# Patient Record
Sex: FEMALE | Race: WHITE | NOT HISPANIC OR LATINO | URBAN - METROPOLITAN AREA
[De-identification: names, ages, dates, MRNs, and addresses within clinical notes are randomized per-mention and may not be internally consistent; named-entity substitution may affect disease eponyms.]

---

## 2019-08-18 ENCOUNTER — OUTPATIENT (OUTPATIENT)
Dept: OUTPATIENT SERVICES | Facility: HOSPITAL | Age: 29
LOS: 1 days | End: 2019-08-18
Payer: COMMERCIAL

## 2019-08-19 DIAGNOSIS — O26.899 OTHER SPECIFIED PREGNANCY RELATED CONDITIONS, UNSPECIFIED TRIMESTER: ICD-10-CM

## 2019-08-19 DIAGNOSIS — Z3A.00 WEEKS OF GESTATION OF PREGNANCY NOT SPECIFIED: ICD-10-CM

## 2019-08-19 LAB
APPEARANCE UR: CLEAR — SIGNIFICANT CHANGE UP
BILIRUB UR-MCNC: NEGATIVE — SIGNIFICANT CHANGE UP
COLOR SPEC: YELLOW — SIGNIFICANT CHANGE UP
DIFF PNL FLD: NEGATIVE — SIGNIFICANT CHANGE UP
GLUCOSE UR QL: NEGATIVE — SIGNIFICANT CHANGE UP
KETONES UR-MCNC: 15 MG/DL
LEUKOCYTE ESTERASE UR-ACNC: NEGATIVE — SIGNIFICANT CHANGE UP
NITRITE UR-MCNC: NEGATIVE — SIGNIFICANT CHANGE UP
PH UR: 6 — SIGNIFICANT CHANGE UP (ref 5–8)
PROT UR-MCNC: NEGATIVE MG/DL — SIGNIFICANT CHANGE UP
SP GR SPEC: <=1.005 — SIGNIFICANT CHANGE UP (ref 1–1.03)
UROBILINOGEN FLD QL: 0.2 E.U./DL — SIGNIFICANT CHANGE UP

## 2019-08-19 PROCEDURE — 81003 URINALYSIS AUTO W/O SCOPE: CPT

## 2019-08-19 PROCEDURE — 99214 OFFICE O/P EST MOD 30 MIN: CPT

## 2019-08-19 RX ORDER — SODIUM CHLORIDE 9 MG/ML
1000 INJECTION, SOLUTION INTRAVENOUS
Refills: 0 | Status: DISCONTINUED | OUTPATIENT
Start: 2019-08-19 | End: 2019-09-03

## 2019-10-24 ENCOUNTER — INPATIENT (INPATIENT)
Facility: HOSPITAL | Age: 29
LOS: 3 days | Discharge: HOME | End: 2019-10-28
Attending: OBSTETRICS & GYNECOLOGY | Admitting: OBSTETRICS & GYNECOLOGY
Payer: COMMERCIAL

## 2019-10-24 VITALS — WEIGHT: 158.73 LBS | HEIGHT: 65 IN

## 2019-10-24 DIAGNOSIS — O26.899 OTHER SPECIFIED PREGNANCY RELATED CONDITIONS, UNSPECIFIED TRIMESTER: ICD-10-CM

## 2019-10-24 DIAGNOSIS — Z3A.00 WEEKS OF GESTATION OF PREGNANCY NOT SPECIFIED: ICD-10-CM

## 2019-10-24 LAB
BASOPHILS # BLD AUTO: 0.04 K/UL — SIGNIFICANT CHANGE UP (ref 0–0.2)
BASOPHILS NFR BLD AUTO: 0.3 % — SIGNIFICANT CHANGE UP (ref 0–2)
EOSINOPHIL # BLD AUTO: 0.06 K/UL — SIGNIFICANT CHANGE UP (ref 0–0.5)
EOSINOPHIL NFR BLD AUTO: 0.5 % — SIGNIFICANT CHANGE UP (ref 0–6)
HCT VFR BLD CALC: 35.2 % — SIGNIFICANT CHANGE UP (ref 34.5–45)
HGB BLD-MCNC: 11.8 G/DL — SIGNIFICANT CHANGE UP (ref 11.5–15.5)
IMM GRANULOCYTES NFR BLD AUTO: 0.9 % — SIGNIFICANT CHANGE UP (ref 0–1.5)
LYMPHOCYTES # BLD AUTO: 18 % — SIGNIFICANT CHANGE UP (ref 13–44)
LYMPHOCYTES # BLD AUTO: 2.29 K/UL — SIGNIFICANT CHANGE UP (ref 1–3.3)
MCHC RBC-ENTMCNC: 28.9 PG — SIGNIFICANT CHANGE UP (ref 27–34)
MCHC RBC-ENTMCNC: 33.5 GM/DL — SIGNIFICANT CHANGE UP (ref 32–36)
MCV RBC AUTO: 86.3 FL — SIGNIFICANT CHANGE UP (ref 80–100)
MONOCYTES # BLD AUTO: 0.9 K/UL — SIGNIFICANT CHANGE UP (ref 0–0.9)
MONOCYTES NFR BLD AUTO: 7.1 % — SIGNIFICANT CHANGE UP (ref 2–14)
NEUTROPHILS # BLD AUTO: 9.3 K/UL — HIGH (ref 1.8–7.4)
NEUTROPHILS NFR BLD AUTO: 73.2 % — SIGNIFICANT CHANGE UP (ref 43–77)
NRBC # BLD: 0 /100 WBCS — SIGNIFICANT CHANGE UP (ref 0–0)
PLATELET # BLD AUTO: 204 K/UL — SIGNIFICANT CHANGE UP (ref 150–400)
RBC # BLD: 4.08 M/UL — SIGNIFICANT CHANGE UP (ref 3.8–5.2)
RBC # FLD: 13.7 % — SIGNIFICANT CHANGE UP (ref 10.3–14.5)
T PALLIDUM AB TITR SER: NEGATIVE — SIGNIFICANT CHANGE UP
WBC # BLD: 12.71 K/UL — HIGH (ref 3.8–10.5)
WBC # FLD AUTO: 12.71 K/UL — HIGH (ref 3.8–10.5)

## 2019-10-24 RX ORDER — CITRIC ACID/SODIUM CITRATE 300-500 MG
30 SOLUTION, ORAL ORAL ONCE
Refills: 0 | Status: DISCONTINUED | OUTPATIENT
Start: 2019-10-24 | End: 2019-10-25

## 2019-10-24 RX ORDER — OXYTOCIN 10 UNIT/ML
333.33 VIAL (ML) INJECTION
Qty: 20 | Refills: 0 | Status: DISCONTINUED | OUTPATIENT
Start: 2019-10-24 | End: 2019-10-25

## 2019-10-24 RX ORDER — SODIUM CHLORIDE 9 MG/ML
1000 INJECTION, SOLUTION INTRAVENOUS
Refills: 0 | Status: DISCONTINUED | OUTPATIENT
Start: 2019-10-24 | End: 2019-10-24

## 2019-10-24 RX ORDER — AZITHROMYCIN 500 MG/1
500 TABLET, FILM COATED ORAL ONCE
Refills: 0 | Status: COMPLETED | OUTPATIENT
Start: 2019-10-24 | End: 2019-10-25

## 2019-10-24 RX ORDER — OXYTOCIN 10 UNIT/ML
1 VIAL (ML) INJECTION
Qty: 30 | Refills: 0 | Status: DISCONTINUED | OUTPATIENT
Start: 2019-10-24 | End: 2019-10-25

## 2019-10-24 RX ORDER — SODIUM CHLORIDE 9 MG/ML
1000 INJECTION, SOLUTION INTRAVENOUS
Refills: 0 | Status: DISCONTINUED | OUTPATIENT
Start: 2019-10-24 | End: 2019-10-25

## 2019-10-24 RX ORDER — CITRIC ACID/SODIUM CITRATE 300-500 MG
15 SOLUTION, ORAL ORAL EVERY 6 HOURS
Refills: 0 | Status: DISCONTINUED | OUTPATIENT
Start: 2019-10-24 | End: 2019-10-25

## 2019-10-24 RX ORDER — CEFAZOLIN SODIUM 1 G
2000 VIAL (EA) INJECTION ONCE
Refills: 0 | Status: DISCONTINUED | OUTPATIENT
Start: 2019-10-24 | End: 2019-10-25

## 2019-10-24 RX ORDER — FENTANYL/BUPIVACAINE/NS/PF 2MCG/ML-.1
250 PLASTIC BAG, INJECTION (ML) INJECTION
Refills: 0 | Status: DISCONTINUED | OUTPATIENT
Start: 2019-10-24 | End: 2019-10-25

## 2019-10-24 RX ORDER — CHLORHEXIDINE GLUCONATE 213 G/1000ML
1 SOLUTION TOPICAL
Refills: 0 | Status: DISCONTINUED | OUTPATIENT
Start: 2019-10-24 | End: 2019-10-25

## 2019-10-24 RX ADMIN — Medication 1 MILLIUNIT(S)/MIN: at 11:02

## 2019-10-24 RX ADMIN — SODIUM CHLORIDE 125 MILLILITER(S): 9 INJECTION, SOLUTION INTRAVENOUS at 07:00

## 2019-10-24 RX ADMIN — SODIUM CHLORIDE 125 MILLILITER(S): 9 INJECTION, SOLUTION INTRAVENOUS at 06:30

## 2019-10-24 RX ADMIN — SODIUM CHLORIDE 125 MILLILITER(S): 9 INJECTION, SOLUTION INTRAVENOUS at 13:27

## 2019-10-25 RX ORDER — SIMETHICONE 80 MG/1
80 TABLET, CHEWABLE ORAL EVERY 4 HOURS
Refills: 0 | Status: DISCONTINUED | OUTPATIENT
Start: 2019-10-25 | End: 2019-10-28

## 2019-10-25 RX ORDER — DIPHENHYDRAMINE HCL 50 MG
25 CAPSULE ORAL EVERY 6 HOURS
Refills: 0 | Status: DISCONTINUED | OUTPATIENT
Start: 2019-10-25 | End: 2019-10-28

## 2019-10-25 RX ORDER — OXYCODONE HYDROCHLORIDE 5 MG/1
5 TABLET ORAL
Refills: 0 | Status: DISCONTINUED | OUTPATIENT
Start: 2019-10-25 | End: 2019-10-28

## 2019-10-25 RX ORDER — GLYCERIN ADULT
1 SUPPOSITORY, RECTAL RECTAL AT BEDTIME
Refills: 0 | Status: DISCONTINUED | OUTPATIENT
Start: 2019-10-25 | End: 2019-10-28

## 2019-10-25 RX ORDER — ACETAMINOPHEN 500 MG
975 TABLET ORAL
Refills: 0 | Status: DISCONTINUED | OUTPATIENT
Start: 2019-10-25 | End: 2019-10-28

## 2019-10-25 RX ORDER — IBUPROFEN 200 MG
600 TABLET ORAL EVERY 6 HOURS
Refills: 0 | Status: COMPLETED | OUTPATIENT
Start: 2019-10-25 | End: 2020-09-22

## 2019-10-25 RX ORDER — MORPHINE SULFATE 50 MG/1
4 CAPSULE, EXTENDED RELEASE ORAL ONCE
Refills: 0 | Status: DISCONTINUED | OUTPATIENT
Start: 2019-10-25 | End: 2019-10-26

## 2019-10-25 RX ORDER — TETANUS TOXOID, REDUCED DIPHTHERIA TOXOID AND ACELLULAR PERTUSSIS VACCINE, ADSORBED 5; 2.5; 8; 8; 2.5 [IU]/.5ML; [IU]/.5ML; UG/.5ML; UG/.5ML; UG/.5ML
0.5 SUSPENSION INTRAMUSCULAR ONCE
Refills: 0 | Status: DISCONTINUED | OUTPATIENT
Start: 2019-10-25 | End: 2019-10-28

## 2019-10-25 RX ORDER — LANOLIN
1 OINTMENT (GRAM) TOPICAL EVERY 6 HOURS
Refills: 0 | Status: DISCONTINUED | OUTPATIENT
Start: 2019-10-25 | End: 2019-10-28

## 2019-10-25 RX ORDER — INFLUENZA VIRUS VACCINE 15; 15; 15; 15 UG/.5ML; UG/.5ML; UG/.5ML; UG/.5ML
0.5 SUSPENSION INTRAMUSCULAR ONCE
Refills: 0 | Status: COMPLETED | OUTPATIENT
Start: 2019-10-25 | End: 2019-10-25

## 2019-10-25 RX ORDER — MAGNESIUM HYDROXIDE 400 MG/1
30 TABLET, CHEWABLE ORAL
Refills: 0 | Status: DISCONTINUED | OUTPATIENT
Start: 2019-10-25 | End: 2019-10-28

## 2019-10-25 RX ORDER — NALOXONE HYDROCHLORIDE 4 MG/.1ML
0.1 SPRAY NASAL
Refills: 0 | Status: DISCONTINUED | OUTPATIENT
Start: 2019-10-25 | End: 2019-10-26

## 2019-10-25 RX ORDER — ENOXAPARIN SODIUM 100 MG/ML
40 INJECTION SUBCUTANEOUS EVERY 24 HOURS
Refills: 0 | Status: DISCONTINUED | OUTPATIENT
Start: 2019-10-25 | End: 2019-10-28

## 2019-10-25 RX ORDER — ONDANSETRON 8 MG/1
4 TABLET, FILM COATED ORAL EVERY 6 HOURS
Refills: 0 | Status: DISCONTINUED | OUTPATIENT
Start: 2019-10-25 | End: 2019-10-28

## 2019-10-25 RX ORDER — DOCUSATE SODIUM 100 MG
100 CAPSULE ORAL
Refills: 0 | Status: DISCONTINUED | OUTPATIENT
Start: 2019-10-25 | End: 2019-10-28

## 2019-10-25 RX ORDER — SODIUM CHLORIDE 9 MG/ML
1000 INJECTION, SOLUTION INTRAVENOUS
Refills: 0 | Status: DISCONTINUED | OUTPATIENT
Start: 2019-10-25 | End: 2019-10-26

## 2019-10-25 RX ORDER — KETOROLAC TROMETHAMINE 30 MG/ML
30 SYRINGE (ML) INJECTION EVERY 6 HOURS
Refills: 0 | Status: DISCONTINUED | OUTPATIENT
Start: 2019-10-25 | End: 2019-10-26

## 2019-10-25 RX ORDER — OXYCODONE HYDROCHLORIDE 5 MG/1
5 TABLET ORAL ONCE
Refills: 0 | Status: DISCONTINUED | OUTPATIENT
Start: 2019-10-25 | End: 2019-10-28

## 2019-10-25 RX ORDER — OXYTOCIN 10 UNIT/ML
333.33 VIAL (ML) INJECTION
Qty: 20 | Refills: 0 | Status: DISCONTINUED | OUTPATIENT
Start: 2019-10-25 | End: 2019-10-26

## 2019-10-25 RX ADMIN — SIMETHICONE 80 MILLIGRAM(S): 80 TABLET, CHEWABLE ORAL at 20:57

## 2019-10-25 RX ADMIN — Medication 100 MILLIGRAM(S): at 12:13

## 2019-10-25 RX ADMIN — ENOXAPARIN SODIUM 40 MILLIGRAM(S): 100 INJECTION SUBCUTANEOUS at 18:39

## 2019-10-25 RX ADMIN — Medication 975 MILLIGRAM(S): at 09:07

## 2019-10-25 RX ADMIN — Medication 975 MILLIGRAM(S): at 16:30

## 2019-10-25 RX ADMIN — Medication 975 MILLIGRAM(S): at 15:29

## 2019-10-25 RX ADMIN — AZITHROMYCIN 255 MILLIGRAM(S): 500 TABLET, FILM COATED ORAL at 00:20

## 2019-10-25 RX ADMIN — Medication 30 MILLIGRAM(S): at 19:30

## 2019-10-25 RX ADMIN — Medication 30 MILLIGRAM(S): at 04:00

## 2019-10-25 RX ADMIN — Medication 975 MILLIGRAM(S): at 21:45

## 2019-10-25 RX ADMIN — Medication 30 MILLIGRAM(S): at 12:12

## 2019-10-25 RX ADMIN — SODIUM CHLORIDE 125 MILLILITER(S): 9 INJECTION, SOLUTION INTRAVENOUS at 00:00

## 2019-10-25 RX ADMIN — Medication 30 MILLIGRAM(S): at 18:39

## 2019-10-25 RX ADMIN — Medication 30 MILLIGRAM(S): at 13:00

## 2019-10-25 RX ADMIN — Medication 975 MILLIGRAM(S): at 03:30

## 2019-10-25 RX ADMIN — Medication 975 MILLIGRAM(S): at 20:57

## 2019-10-25 RX ADMIN — Medication 975 MILLIGRAM(S): at 10:00

## 2019-10-25 NOTE — LACTATION INITIAL EVALUATION - REQUESTED BY
lactation rounds, initial visit. Primaparous mother delivered @ 404/7 weeks gestation via c/s. 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 The bedside RN reported that the  had a low temperture on the night shift & a low chemstrip of 44 @ 7:09 am. The  was given dextrose oral gel & fed with 12 mls of formula & the chemstrip came up to 65 & have been WNL since. The bedside RN & the mother report that the  has been latching on & breast feeding well. The  was ~ 15 hours old when I consulted @ the bedside & the mother reported that the  breast fed for 20 minutes on each breast ~ 30 minutes before I arrived @ the bedside. The  was asleep. The  was mec stained @ birth & remains DTV. Breast feeding guidelines, positioning the  to the breast, latching strategies, early feeding cues, cluster feedings, hand expression and 's output requirements reviewed with the mother. Written literature was given to the mother on the above. S2S was encouraged. All questions were answered and the mother verbalized understanding. I encouraged the mother to ask for lactation assistance as needed.

## 2019-10-26 LAB
HCT VFR BLD CALC: 25.7 % — LOW (ref 34.5–45)
HGB BLD-MCNC: 8.3 G/DL — LOW (ref 11.5–15.5)
MCHC RBC-ENTMCNC: 28.8 PG — SIGNIFICANT CHANGE UP (ref 27–34)
MCHC RBC-ENTMCNC: 32.3 GM/DL — SIGNIFICANT CHANGE UP (ref 32–36)
MCV RBC AUTO: 89.2 FL — SIGNIFICANT CHANGE UP (ref 80–100)
NRBC # BLD: 0 /100 WBCS — SIGNIFICANT CHANGE UP (ref 0–0)
PLATELET # BLD AUTO: 166 K/UL — SIGNIFICANT CHANGE UP (ref 150–400)
RBC # BLD: 2.88 M/UL — LOW (ref 3.8–5.2)
RBC # FLD: 14.5 % — SIGNIFICANT CHANGE UP (ref 10.3–14.5)
WBC # BLD: 20.72 K/UL — HIGH (ref 3.8–10.5)
WBC # FLD AUTO: 20.72 K/UL — HIGH (ref 3.8–10.5)

## 2019-10-26 RX ORDER — IBUPROFEN 200 MG
600 TABLET ORAL EVERY 6 HOURS
Refills: 0 | Status: DISCONTINUED | OUTPATIENT
Start: 2019-10-26 | End: 2019-10-28

## 2019-10-26 RX ADMIN — ENOXAPARIN SODIUM 40 MILLIGRAM(S): 100 INJECTION SUBCUTANEOUS at 14:34

## 2019-10-26 RX ADMIN — Medication 600 MILLIGRAM(S): at 14:30

## 2019-10-26 RX ADMIN — Medication 600 MILLIGRAM(S): at 23:10

## 2019-10-26 RX ADMIN — SIMETHICONE 80 MILLIGRAM(S): 80 TABLET, CHEWABLE ORAL at 06:22

## 2019-10-26 RX ADMIN — Medication 100 MILLIGRAM(S): at 18:47

## 2019-10-26 RX ADMIN — Medication 30 MILLIGRAM(S): at 00:31

## 2019-10-26 RX ADMIN — Medication 100 MILLIGRAM(S): at 06:22

## 2019-10-26 RX ADMIN — Medication 600 MILLIGRAM(S): at 22:33

## 2019-10-26 RX ADMIN — Medication 975 MILLIGRAM(S): at 11:01

## 2019-10-26 RX ADMIN — Medication 975 MILLIGRAM(S): at 18:46

## 2019-10-26 RX ADMIN — Medication 975 MILLIGRAM(S): at 11:35

## 2019-10-26 RX ADMIN — Medication 600 MILLIGRAM(S): at 06:22

## 2019-10-26 RX ADMIN — Medication 600 MILLIGRAM(S): at 07:00

## 2019-10-26 RX ADMIN — Medication 30 MILLIGRAM(S): at 01:15

## 2019-10-26 RX ADMIN — Medication 600 MILLIGRAM(S): at 15:10

## 2019-10-26 NOTE — DISCHARGE NOTE OB - CARE PLAN
Principal Discharge DX:	Postpartum state  Goal:	to feel; well  Assessment and plan of treatment:	29y female s/p  section, meeting all postpartum milestones. No heavy lifting. Pelvic rest until cleared. Follow-up with obstetrician in 1-2 weeks.

## 2019-10-26 NOTE — DISCHARGE NOTE OB - CARE PROVIDER_API CALL
Kiana Quach (DO; MS)  [facility]  [facility] [address] [place] [address]  Phone: [phone]  Fax: [phone]  Follow Up Time:

## 2019-10-26 NOTE — DISCHARGE NOTE OB - PLAN OF CARE
to feel; well 29y female s/p  section, meeting all postpartum milestones. No heavy lifting. Pelvic rest until cleared. Follow-up with obstetrician in 1-2 weeks.

## 2019-10-26 NOTE — PROGRESS NOTE ADULT - ASSESSMENT
A/P   29y  s/p  section, POD #1, stable  -  Pain: PO motrin, percocets for severe pain PRN  -  Post-operatively labs: f/u post-op Hgb , hemodynamically stable, no symptoms of anemia   -  GI: tolerating clears, passing gas, ADAT  -  : passed TOV  -  DVT prophylaxis: ambulation, SCDs, SQH  -  Dispo: POD 3 or 4

## 2019-10-26 NOTE — PROGRESS NOTE ADULT - SUBJECTIVE AND OBJECTIVE BOX
6/12/2017       RE: Allyson Sauceda  [address] [place] [address]           Dear Colleague,    Thank you for referring your patient, Alylson Sauceda, to the [facility] ORTHOPEDIC SURGERY. Please see a copy of my visit note below.    HISTORY OF PRESENT ILLNESS:    Allyson Saucead is a 49 year old female who is seen in consultation at the request of Dr. Bolivar for left elbow, cyst.    Present symptoms: Pt states cyst has been present for over a year, has slowly grown in size.  States it is not painful but will have some discomfort if resting on elbow for extended periods.  She is right-hand dominant.  No limitation of elbow range of motion  No constitutional symptoms Of significant weight loss, nighttime fever or chills  Treatments tried to this point: nothing  Orthopedic PMH: neck surgery - C5/6    No past medical history on file.None other than prior neck problem    Past Surgical History:   Procedure Laterality Date     NECK SURGERY  2005    C5-6 fracture - has plates placed       Family History   Problem Relation Age of Onset     DIABETES Mother      Hyperlipidemia Mother      Alzheimer Disease Mother      Hypertension Mother      Alzheimer Disease Maternal Grandmother      Alzheimer Disease Maternal Aunt      Breast Cancer No family hx of      Coronary Artery Disease No family hx of      Colon Cancer No family hx of      Thyroid Disease No family hx of      Genetic Disorder No family hx of        Social History     Social History     Marital status:      Spouse name: N/A     Number of children: N/A     Years of education: N/A     Occupational History     Not on file.     Social History Main Topics     Smoking status: Current Every Day Smoker     Packs/day: 0.50     Years: 30.00     Types: Cigarettes     Smokeless tobacco: Not on file     Alcohol use No     Drug use: Yes      Comment: Marjuana     Sexual activity: Yes     Partners: Male     Other Topics Concern     Not on file     Social History Narrative  "      Current Outpatient Prescriptions   Medication Sig Dispense Refill     Multiple Vitamins-Minerals (MULTIVITAMIN GUMMIES ADULTS PO)        Biotin w/ Vitamins C & E (HAIR SKIN & NAILS GUMMIES PO)        nicotine (NICODERM CQ) 14 MG/24HR 24 hr patch Place 1 patch onto the skin every 24 hours (Patient not taking: Reported on 4/12/2017) 30 patch 0     nicotine (NICODERM CQ) 21 MG/24HR 24 hr patch Place 1 patch onto the skin every 24 hours (Patient not taking: Reported on 4/12/2017) 30 patch 0     nicotine (NICODERM CQ) 7 MG/24HR 24 hr patch Place 1 patch onto the skin every 24 hours (Patient not taking: Reported on 4/12/2017) 30 patch 0       No Known Allergies    REVIEW OF SYSTEMS:  CONSTITUTIONAL:  NEGATIVE for fever, chills, change in weight  INTEGUMENTARY/SKIN:  NEGATIVE for worrisome rashes, moles or lesions  EYES:  NEGATIVE for vision changes or irritation  ENT/MOUTH:  NEGATIVE for ear, mouth and throat problems  RESP:  NEGATIVE for significant cough or SOB  BREAST:  NEGATIVE for masses, tenderness or discharge  CV:  NEGATIVE for chest pain, palpitations or peripheral edema  GI:  abdominal pain  :  Negative   MUSCULOSKELETAL:  See HPI above  NEURO:  NEGATIVE for weakness, dizziness or paresthesias  ENDOCRINE:  NEGATIVE for temperature intolerance, skin/hair changes  HEME/ALLERGY/IMMUNE:  NEGATIVE for bleeding problems  PSYCHIATRIC:  NEGATIVE for changes in mood or affect      PHYSICAL EXAM:  /66 (BP Location: Right arm, Patient Position: Chair, Cuff Size: Adult Small)  Ht 5' 7.9\" (1.725 m)  Wt 121 lb (54.9 kg)  BMI 18.45 kg/m2  Body mass index is 18.45 kg/(m^2).   GENERAL APPEARANCE: healthy, alert and no distress   HEENT: No apparent thyroid megaly. Clear sclera with normal ocular movement  RESPIRATORY: No labored breathing  SKIN: no suspicious lesions or rashes  NEURO: Normal strength and tone, mentation intact and speech normal  VASCULAR: Good pulses, and capillary refill   LYMPH: no " Patient evaluated at bedside this morning, resting comfortable in bed, with no acute events overnight.  She reports pain is well controlled  She denies headache, dizziness, chest pain, palpitations, shortness of breathe, nausea, vomiting or heavy vaginal bleeding.  She has not tried ambulating since procedure, pittman remains in place at this time. Tolerating clear liquids.     Physical Exam:  Vital Signs Last 24 Hrs  T(C): 36.5 (26 Oct 2019 06:00), Max: 36.6 (25 Oct 2019 10:55)  T(F): 97.7 (26 Oct 2019 06:00), Max: 97.8 (25 Oct 2019 10:55)  HR: 70 (26 Oct 2019 06:00) (61 - 73)  BP: 96/58 (26 Oct 2019 06:00) (96/58 - 126/82)  BP(mean): --  RR: 18 (26 Oct 2019 06:00) (17 - 18)  SpO2: 97% (26 Oct 2019 06:00) (96% - 98%)    GA: NAD, A+0 x 3  CV: RRR, no murmurs/rubs  Pulm: CTAB, no wheezes/rhonchi  Breasts: soft, nontender, no palpable masses  Abd: + BS, soft, nontender, nondistended, no rebound or guarding, incision clean, dry and intact, uterus firm at midline, 1 fb below umbilicus  Extremities: no swelling or calf tenderness, reflexes +2 bilaterally, SCD in place                  acetaminophen   Tablet .. 975 milliGRAM(s) Oral <User Schedule>  diphenhydrAMINE 25 milliGRAM(s) Oral every 6 hours PRN  diphtheria/tetanus/pertussis (acellular) Vaccine (ADAcel) 0.5 milliLiter(s) IntraMuscular once  docusate sodium 100 milliGRAM(s) Oral two times a day  enoxaparin Injectable 40 milliGRAM(s) SubCutaneous every 24 hours  glycerin Suppository - Adult 1 Suppository(s) Rectal at bedtime PRN  ibuprofen  Tablet. 600 milliGRAM(s) Oral every 6 hours  lanolin Ointment 1 Application(s) Topical every 6 hours PRN  magnesium hydroxide Suspension 30 milliLiter(s) Oral two times a day PRN  ondansetron Injectable 4 milliGRAM(s) IV Push every 6 hours PRN  oxyCODONE    IR 5 milliGRAM(s) Oral every 3 hours PRN  oxyCODONE    IR 5 milliGRAM(s) Oral once PRN  simethicone 80 milliGRAM(s) Chew every 4 hours PRN lymphadenopathy   PSYCH:  mentation appears normal and affect normal/bright    MUSCULOSKELETAL:  Presence of semi-firm subcutaneous mass in the olecranon region of the left elbow is noted  It is not pulsatile  No fluctuant nature is noted  The size of the mass is about 1.5 cm in diameter, fairly circular  Full flexion and extension of left elbow is noted  The particular tenderness with palpation of the mass  Distal neurovascular status is intact     ASSESSMENT:  Posterior elbow mass, 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  Given the fact that she has noted rather noticeable enlargement of the size, it is very reasonable for her to consider surgical excision.  Choices for anesthesia, namely local versus local MAC were explained.  If she were to do it, she is leaning towards doing it under local MAC.  Possible recurrent formation assist and potential wound issues including the possibility of keloid were thoroughly explained.  I asked her to go home and think it through and get back to us if she decided to go ahead with the surgery sometime in the future.  The proposed surgery would include excision of the left elbow mass under local MAC.      Imaging Interpretation:   None taken today    Sean Valadez MD  Department of Orthopedic Surgery        Disclaimer: This note consists of symbols derived from keyboarding, dictation and/or voice recognition software. As a result, there may be errors in the script that have gone undetected. Please consider this when interpreting information found in this chart.      Again, thank you for allowing me to participate in the care of your patient.        Sincerely,              Sean Valadez MD     Patient evaluated at bedside this morning, resting comfortable in bed, with no acute events overnight.  She reports pain is well controlled  She denies headache, dizziness, chest pain, palpitations, shortness of breathe, nausea, vomiting or heavy vaginal bleeding.  Finn recently removed, has already gotten up to urinate. Tolerating clear liquids.     Physical Exam:  Vital Signs Last 24 Hrs  T(C): 36.5 (26 Oct 2019 06:00), Max: 36.6 (25 Oct 2019 10:55)  T(F): 97.7 (26 Oct 2019 06:00), Max: 97.8 (25 Oct 2019 10:55)  HR: 70 (26 Oct 2019 06:00) (61 - 73)  BP: 96/58 (26 Oct 2019 06:00) (96/58 - 126/82)  BP(mean): --  RR: 18 (26 Oct 2019 06:00) (17 - 18)  SpO2: 97% (26 Oct 2019 06:00) (96% - 98%)    GA: NAD, A+0 x 3  CV: RRR, no murmurs/rubs  Pulm: CTAB, no wheezes/rhonchi  Breasts: soft, nontender, no palpable masses  Abd: + BS, soft, nontender, nondistended, no rebound or guarding, incision clean, dry and intact, uterus firm at midline, 1 fb below umbilicus  Extremities: no swelling or calf tenderness, reflexes +2 bilaterally, SCD in place                  acetaminophen   Tablet .. 975 milliGRAM(s) Oral <User Schedule>  diphenhydrAMINE 25 milliGRAM(s) Oral every 6 hours PRN  diphtheria/tetanus/pertussis (acellular) Vaccine (ADAcel) 0.5 milliLiter(s) IntraMuscular once  docusate sodium 100 milliGRAM(s) Oral two times a day  enoxaparin Injectable 40 milliGRAM(s) SubCutaneous every 24 hours  glycerin Suppository - Adult 1 Suppository(s) Rectal at bedtime PRN  ibuprofen  Tablet. 600 milliGRAM(s) Oral every 6 hours  lanolin Ointment 1 Application(s) Topical every 6 hours PRN  magnesium hydroxide Suspension 30 milliLiter(s) Oral two times a day PRN  ondansetron Injectable 4 milliGRAM(s) IV Push every 6 hours PRN  oxyCODONE    IR 5 milliGRAM(s) Oral every 3 hours PRN  oxyCODONE    IR 5 milliGRAM(s) Oral once PRN  simethicone 80 milliGRAM(s) Chew every 4 hours PRN

## 2019-10-27 RX ADMIN — Medication 600 MILLIGRAM(S): at 22:35

## 2019-10-27 RX ADMIN — SIMETHICONE 80 MILLIGRAM(S): 80 TABLET, CHEWABLE ORAL at 04:28

## 2019-10-27 RX ADMIN — Medication 1 APPLICATION(S): at 21:40

## 2019-10-27 RX ADMIN — Medication 100 MILLIGRAM(S): at 17:04

## 2019-10-27 RX ADMIN — Medication 975 MILLIGRAM(S): at 11:00

## 2019-10-27 RX ADMIN — Medication 600 MILLIGRAM(S): at 04:28

## 2019-10-27 RX ADMIN — Medication 600 MILLIGRAM(S): at 05:15

## 2019-10-27 RX ADMIN — Medication 975 MILLIGRAM(S): at 17:03

## 2019-10-27 RX ADMIN — Medication 975 MILLIGRAM(S): at 18:00

## 2019-10-27 RX ADMIN — ENOXAPARIN SODIUM 40 MILLIGRAM(S): 100 INJECTION SUBCUTANEOUS at 13:45

## 2019-10-27 RX ADMIN — Medication 600 MILLIGRAM(S): at 14:30

## 2019-10-27 RX ADMIN — Medication 975 MILLIGRAM(S): at 10:33

## 2019-10-27 RX ADMIN — Medication 600 MILLIGRAM(S): at 21:39

## 2019-10-27 RX ADMIN — SIMETHICONE 80 MILLIGRAM(S): 80 TABLET, CHEWABLE ORAL at 21:39

## 2019-10-27 RX ADMIN — Medication 600 MILLIGRAM(S): at 13:46

## 2019-10-27 NOTE — PROGRESS NOTE ADULT - SUBJECTIVE AND OBJECTIVE BOX
Patient evaluated at bedside this morning, resting comfortable in bed.   She reports pain is well controlled with  She denies headache, dizziness, chest pain, palpitations, shortness of breathe, nausea, vomiting or heavy vaginal bleeding.  She has been ambulating without assistance, voiding spontaneously, passing gas, tolerating regular diet and is breastfeeding.    Physical Exam:  Vital Signs Last 24 Hrs  T(C): 36.6 (26 Oct 2019 17:55), Max: 37.1 (26 Oct 2019 13:57)  T(F): 97.8 (26 Oct 2019 17:55), Max: 98.7 (26 Oct 2019 13:57)  HR: 84 (26 Oct 2019 17:55) (76 - 84)  BP: 99/63 (26 Oct 2019 17:55) (94/57 - 99/63)  BP(mean): --  RR: 17 (26 Oct 2019 17:55) (17 - 18)  SpO2: 98% (26 Oct 2019 17:55) (97% - 98%)    GA: NAD, A+0 x 3  CV: RRR  Pulm: CTAB  Breasts: soft, nontender, no palpable masses  Abd: + BS, soft, nontender, nondistended, no rebound or guarding, incision clean, dry and intact, uterus firm at midline, 1fb below umbilicus  : lochia WNL  Extremities: no swelling or calf tenderness                             8.3    20.72 )-----------( 166      ( 26 Oct 2019 10:15 )             25.7 Patient evaluated at bedside this morning, resting comfortable in bed.   She reports pain is well controlled with tylenol and motrin  She denies headache, dizziness, chest pain, palpitations, shortness of breathe, nausea, vomiting or heavy vaginal bleeding.  She has been ambulating without assistance, voiding spontaneously, passing gas, tolerating regular diet and is breastfeeding.    Physical Exam:  Vital Signs Last 24 Hrs  T(C): 36.6 (26 Oct 2019 17:55), Max: 37.1 (26 Oct 2019 13:57)  T(F): 97.8 (26 Oct 2019 17:55), Max: 98.7 (26 Oct 2019 13:57)  HR: 84 (26 Oct 2019 17:55) (76 - 84)  BP: 99/63 (26 Oct 2019 17:55) (94/57 - 99/63)  BP(mean): --  RR: 17 (26 Oct 2019 17:55) (17 - 18)  SpO2: 98% (26 Oct 2019 17:55) (97% - 98%)    GA: NAD, A+0 x 3  CV: RRR  Pulm: CTAB  Breasts: soft, nontender, no palpable masses  Abd: + BS, soft, nontender, nondistended, no rebound or guarding, incision clean, dry and intact, uterus firm at midline, 1fb below umbilicus  : lochia WNL  Extremities: no swelling or calf tenderness                             8.3    20.72 )-----------( 166      ( 26 Oct 2019 10:15 )             25.7

## 2019-10-27 NOTE — PROGRESS NOTE ADULT - ASSESSMENT
A/P: 29y s/p  section, POD#2, stable  -  Pain: PO motrin q6hrs, tylenol q8hrs, oxycodone for severe pain PRN  -  Hemodynamically stable, no symptoms of anemia   -  GI: tolerating regular diet, passing gas, colace PRN  -  : s/p pittman , urinating without difficulty  -  DVT prophylaxis: encouraged increased ambulation, SCDs, SQH  -  Dispo: POD 3 or 4

## 2019-10-28 VITALS
HEART RATE: 83 BPM | RESPIRATION RATE: 17 BRPM | TEMPERATURE: 98 F | DIASTOLIC BLOOD PRESSURE: 75 MMHG | OXYGEN SATURATION: 98 % | SYSTOLIC BLOOD PRESSURE: 111 MMHG

## 2019-10-28 PROCEDURE — 88307 TISSUE EXAM BY PATHOLOGIST: CPT

## 2019-10-28 PROCEDURE — 36415 COLL VENOUS BLD VENIPUNCTURE: CPT

## 2019-10-28 PROCEDURE — 86780 TREPONEMA PALLIDUM: CPT

## 2019-10-28 PROCEDURE — 86901 BLOOD TYPING SEROLOGIC RH(D): CPT

## 2019-10-28 PROCEDURE — 85027 COMPLETE CBC AUTOMATED: CPT

## 2019-10-28 PROCEDURE — 99214 OFFICE O/P EST MOD 30 MIN: CPT

## 2019-10-28 PROCEDURE — 86850 RBC ANTIBODY SCREEN: CPT

## 2019-10-28 PROCEDURE — 85025 COMPLETE CBC W/AUTO DIFF WBC: CPT

## 2019-10-28 PROCEDURE — 86900 BLOOD TYPING SEROLOGIC ABO: CPT

## 2019-10-28 RX ADMIN — SIMETHICONE 80 MILLIGRAM(S): 80 TABLET, CHEWABLE ORAL at 06:30

## 2019-10-28 RX ADMIN — Medication 600 MILLIGRAM(S): at 12:52

## 2019-10-28 RX ADMIN — Medication 600 MILLIGRAM(S): at 06:25

## 2019-10-28 RX ADMIN — Medication 975 MILLIGRAM(S): at 09:25

## 2019-10-28 RX ADMIN — Medication 600 MILLIGRAM(S): at 12:05

## 2019-10-28 RX ADMIN — Medication 100 MILLIGRAM(S): at 06:26

## 2019-10-28 RX ADMIN — Medication 975 MILLIGRAM(S): at 03:03

## 2019-10-28 RX ADMIN — Medication 975 MILLIGRAM(S): at 04:00

## 2019-10-28 RX ADMIN — Medication 600 MILLIGRAM(S): at 07:20

## 2019-10-28 RX ADMIN — Medication 975 MILLIGRAM(S): at 10:10

## 2019-10-30 LAB — SURGICAL PATHOLOGY STUDY: SIGNIFICANT CHANGE UP

## 2019-11-06 DIAGNOSIS — Z3A.40 40 WEEKS GESTATION OF PREGNANCY: ICD-10-CM

## 2019-11-06 DIAGNOSIS — Z28.21 IMMUNIZATION NOT CARRIED OUT BECAUSE OF PATIENT REFUSAL: ICD-10-CM

## 2019-11-09 ENCOUNTER — EMERGENCY (EMERGENCY)
Facility: HOSPITAL | Age: 29
LOS: 1 days | Discharge: HOME | End: 2019-11-09
Attending: EMERGENCY MEDICINE | Admitting: EMERGENCY MEDICINE
Payer: COMMERCIAL

## 2019-11-09 VITALS
HEART RATE: 125 BPM | OXYGEN SATURATION: 99 % | WEIGHT: 132.28 LBS | SYSTOLIC BLOOD PRESSURE: 110 MMHG | TEMPERATURE: 100 F | RESPIRATION RATE: 16 BRPM | HEIGHT: 64.17 IN | DIASTOLIC BLOOD PRESSURE: 71 MMHG

## 2019-11-09 VITALS
HEART RATE: 98 BPM | DIASTOLIC BLOOD PRESSURE: 70 MMHG | SYSTOLIC BLOOD PRESSURE: 114 MMHG | OXYGEN SATURATION: 99 % | TEMPERATURE: 98 F | RESPIRATION RATE: 14 BRPM

## 2019-11-09 DIAGNOSIS — L03.311 CELLULITIS OF ABDOMINAL WALL: ICD-10-CM

## 2019-11-09 DIAGNOSIS — L53.9 ERYTHEMATOUS CONDITION, UNSPECIFIED: ICD-10-CM

## 2019-11-09 DIAGNOSIS — L02.211 CUTANEOUS ABSCESS OF ABDOMINAL WALL: ICD-10-CM

## 2019-11-09 DIAGNOSIS — Z98.891 HISTORY OF UTERINE SCAR FROM PREVIOUS SURGERY: Chronic | ICD-10-CM

## 2019-11-09 LAB
ALBUMIN SERPL ELPH-MCNC: 4.2 G/DL — SIGNIFICANT CHANGE UP (ref 3.3–5)
ALP SERPL-CCNC: 95 U/L — SIGNIFICANT CHANGE UP (ref 40–120)
ALT FLD-CCNC: 14 U/L — SIGNIFICANT CHANGE UP (ref 10–45)
ANION GAP SERPL CALC-SCNC: 13 MMOL/L — SIGNIFICANT CHANGE UP (ref 5–17)
AST SERPL-CCNC: 15 U/L — SIGNIFICANT CHANGE UP (ref 10–40)
BASOPHILS # BLD AUTO: 0.04 K/UL — SIGNIFICANT CHANGE UP (ref 0–0.2)
BASOPHILS NFR BLD AUTO: 0.2 % — SIGNIFICANT CHANGE UP (ref 0–2)
BILIRUB SERPL-MCNC: 0.3 MG/DL — SIGNIFICANT CHANGE UP (ref 0.2–1.2)
BUN SERPL-MCNC: 6 MG/DL — LOW (ref 7–23)
CALCIUM SERPL-MCNC: 9.6 MG/DL — SIGNIFICANT CHANGE UP (ref 8.4–10.5)
CHLORIDE SERPL-SCNC: 103 MMOL/L — SIGNIFICANT CHANGE UP (ref 96–108)
CO2 SERPL-SCNC: 25 MMOL/L — SIGNIFICANT CHANGE UP (ref 22–31)
CREAT SERPL-MCNC: 0.51 MG/DL — SIGNIFICANT CHANGE UP (ref 0.5–1.3)
EOSINOPHIL # BLD AUTO: 0.06 K/UL — SIGNIFICANT CHANGE UP (ref 0–0.5)
EOSINOPHIL NFR BLD AUTO: 0.3 % — SIGNIFICANT CHANGE UP (ref 0–6)
GLUCOSE SERPL-MCNC: 107 MG/DL — HIGH (ref 70–99)
HCT VFR BLD CALC: 35.2 % — SIGNIFICANT CHANGE UP (ref 34.5–45)
HGB BLD-MCNC: 11 G/DL — LOW (ref 11.5–15.5)
IMM GRANULOCYTES NFR BLD AUTO: 0.6 % — SIGNIFICANT CHANGE UP (ref 0–1.5)
LACTATE SERPL-SCNC: 1.3 MMOL/L — SIGNIFICANT CHANGE UP (ref 0.5–2)
LYMPHOCYTES # BLD AUTO: 1.95 K/UL — SIGNIFICANT CHANGE UP (ref 1–3.3)
LYMPHOCYTES # BLD AUTO: 10.9 % — LOW (ref 13–44)
MCHC RBC-ENTMCNC: 27.9 PG — SIGNIFICANT CHANGE UP (ref 27–34)
MCHC RBC-ENTMCNC: 31.3 GM/DL — LOW (ref 32–36)
MCV RBC AUTO: 89.3 FL — SIGNIFICANT CHANGE UP (ref 80–100)
MONOCYTES # BLD AUTO: 1.23 K/UL — HIGH (ref 0–0.9)
MONOCYTES NFR BLD AUTO: 6.9 % — SIGNIFICANT CHANGE UP (ref 2–14)
NEUTROPHILS # BLD AUTO: 14.45 K/UL — HIGH (ref 1.8–7.4)
NEUTROPHILS NFR BLD AUTO: 81.1 % — HIGH (ref 43–77)
NRBC # BLD: 0 /100 WBCS — SIGNIFICANT CHANGE UP (ref 0–0)
PLATELET # BLD AUTO: 591 K/UL — HIGH (ref 150–400)
POTASSIUM SERPL-MCNC: 3.3 MMOL/L — LOW (ref 3.5–5.3)
POTASSIUM SERPL-SCNC: 3.3 MMOL/L — LOW (ref 3.5–5.3)
PROT SERPL-MCNC: 8.1 G/DL — SIGNIFICANT CHANGE UP (ref 6–8.3)
RBC # BLD: 3.94 M/UL — SIGNIFICANT CHANGE UP (ref 3.8–5.2)
RBC # FLD: 13.4 % — SIGNIFICANT CHANGE UP (ref 10.3–14.5)
SODIUM SERPL-SCNC: 141 MMOL/L — SIGNIFICANT CHANGE UP (ref 135–145)
WBC # BLD: 17.84 K/UL — HIGH (ref 3.8–10.5)
WBC # FLD AUTO: 17.84 K/UL — HIGH (ref 3.8–10.5)

## 2019-11-09 PROCEDURE — 99284 EMERGENCY DEPT VISIT MOD MDM: CPT | Mod: 25

## 2019-11-09 PROCEDURE — 83605 ASSAY OF LACTIC ACID: CPT

## 2019-11-09 PROCEDURE — 85025 COMPLETE CBC W/AUTO DIFF WBC: CPT

## 2019-11-09 PROCEDURE — 80053 COMPREHEN METABOLIC PANEL: CPT

## 2019-11-09 PROCEDURE — 93010 ELECTROCARDIOGRAM REPORT: CPT

## 2019-11-09 PROCEDURE — 87040 BLOOD CULTURE FOR BACTERIA: CPT

## 2019-11-09 PROCEDURE — 96374 THER/PROPH/DIAG INJ IV PUSH: CPT | Mod: XU

## 2019-11-09 PROCEDURE — 87186 SC STD MICRODIL/AGAR DIL: CPT

## 2019-11-09 PROCEDURE — 36415 COLL VENOUS BLD VENIPUNCTURE: CPT

## 2019-11-09 PROCEDURE — 87070 CULTURE OTHR SPECIMN AEROBIC: CPT

## 2019-11-09 PROCEDURE — 96375 TX/PRO/DX INJ NEW DRUG ADDON: CPT | Mod: XU

## 2019-11-09 PROCEDURE — 99291 CRITICAL CARE FIRST HOUR: CPT

## 2019-11-09 PROCEDURE — 10061 I&D ABSCESS COMP/MULTIPLE: CPT

## 2019-11-09 PROCEDURE — 93005 ELECTROCARDIOGRAM TRACING: CPT | Mod: XU

## 2019-11-09 RX ORDER — SODIUM CHLORIDE 9 MG/ML
1000 INJECTION INTRAMUSCULAR; INTRAVENOUS; SUBCUTANEOUS ONCE
Refills: 0 | Status: COMPLETED | OUTPATIENT
Start: 2019-11-09 | End: 2019-11-09

## 2019-11-09 RX ORDER — POTASSIUM CHLORIDE 20 MEQ
40 PACKET (EA) ORAL ONCE
Refills: 0 | Status: COMPLETED | OUTPATIENT
Start: 2019-11-09 | End: 2019-11-09

## 2019-11-09 RX ORDER — KETOROLAC TROMETHAMINE 30 MG/ML
30 SYRINGE (ML) INJECTION ONCE
Refills: 0 | Status: DISCONTINUED | OUTPATIENT
Start: 2019-11-09 | End: 2019-11-09

## 2019-11-09 RX ORDER — MORPHINE SULFATE 50 MG/1
2 CAPSULE, EXTENDED RELEASE ORAL ONCE
Refills: 0 | Status: DISCONTINUED | OUTPATIENT
Start: 2019-11-09 | End: 2019-11-09

## 2019-11-09 RX ORDER — AMPICILLIN SODIUM AND SULBACTAM SODIUM 250; 125 MG/ML; MG/ML
3 INJECTION, POWDER, FOR SUSPENSION INTRAMUSCULAR; INTRAVENOUS ONCE
Refills: 0 | Status: COMPLETED | OUTPATIENT
Start: 2019-11-09 | End: 2019-11-09

## 2019-11-09 RX ORDER — ACETAMINOPHEN 500 MG
650 TABLET ORAL ONCE
Refills: 0 | Status: COMPLETED | OUTPATIENT
Start: 2019-11-09 | End: 2019-11-09

## 2019-11-09 RX ADMIN — Medication 300 MILLIGRAM(S): at 15:16

## 2019-11-09 RX ADMIN — Medication 30 MILLIGRAM(S): at 13:46

## 2019-11-09 RX ADMIN — Medication 650 MILLIGRAM(S): at 13:46

## 2019-11-09 RX ADMIN — AMPICILLIN SODIUM AND SULBACTAM SODIUM 200 GRAM(S): 250; 125 INJECTION, POWDER, FOR SUSPENSION INTRAMUSCULAR; INTRAVENOUS at 13:46

## 2019-11-09 RX ADMIN — SODIUM CHLORIDE 1000 MILLILITER(S): 9 INJECTION INTRAMUSCULAR; INTRAVENOUS; SUBCUTANEOUS at 13:25

## 2019-11-09 RX ADMIN — Medication 40 MILLIEQUIVALENT(S): at 15:16

## 2019-11-09 RX ADMIN — SODIUM CHLORIDE 1000 MILLILITER(S): 9 INJECTION INTRAMUSCULAR; INTRAVENOUS; SUBCUTANEOUS at 15:16

## 2019-11-09 NOTE — ED ADULT NURSE REASSESSMENT NOTE - NS ED NURSE REASSESS COMMENT FT1
GYN consult came and placed an incision to drain the wound. Pt calm, and tolerated procedure well. Packing placed by GYN MD, gauze applied to incision site and secured. Pt instructed on how and when to change the dressing. Surgical swab cultures taken and sent to lab.

## 2019-11-09 NOTE — CONSULT NOTE ADULT - SUBJECTIVE AND OBJECTIVE BOX
29y  POD#15 s/p c/s on 10/24 presenting with x3 days of swelling and redness around incision site. Noticed a few days ago that incision started becoming swollen, yesterday became red and very painful so she went to urgent care and was discharged with Keflex. Today pain increased and incision became more red. Denies fevers, chills, drainage from incision.     POBHx: G1 - c/s 10/24/19 for failure to dilate at 7cm   PGYNHx: Denies abnormal pap smears, fibroids, ovarian cysts, or STIs.   PMHx: Denies HTN, DM, thyroid issues, bleeding/clotting d/o, or asthma.  PSHx: c/s x1   Med: PNV  NKDA     T(C): 36.7 (19 @ 14:57), Max: 37.6 (19 @ 13:16)  HR: 98 (19 @ 14:57) (98 - 125)  BP: 114/70 (19 @ 14:57) (110/71 - 146/70)  RR: 14 (19 @ 14:57) (14 - 16)  SpO2: 99% (19 @ 14:57) (99% - 100%)    PHYSICAL EXAM:   Gen: NAD  GI: soft, nontender, nondistended, + BS, no rebound no guarding  : normal external genitalia   Skin: 3cm area of erythema and induration around phfannenstiel incison, incision in tact with no openings or drainage.   Ext: wnl        LABS:                        11.0   17.84 )-----------( 591      ( 2019 14:02 )             35.2         141  |  103  |  6<L>  ----------------------------<  107<H>  3.3<L>   |  25  |  0.51    Ca    9.6      2019 14:02    TPro  8.1  /  Alb  4.2  /  TBili  0.3  /  DBili  x   /  AST  15  /  ALT  14  /  AlkPhos  95        Procedure note:   Incision site was prepped with betadine   10mL lidocaine was injected into incision   #11 blade used to make ~4mm incision in center of Pfannenstiel   Incision expressed, purulent drainage from site   Culture taken  Site probed with blunt prove, fascia in tact   Packed with packing   Cleaned with betadine again and abdominal dressing placed

## 2019-11-09 NOTE — ED PROVIDER NOTE - SKIN, MLM
Positive erythema and edema along incisional site, more prominent along right aspect. Questionable fluctuance. No red streaks noted.

## 2019-11-09 NOTE — ED ADULT NURSE NOTE - OBJECTIVE STATEMENT
now P1 by c section on 10/25 for failure to progress; term delivery; uneventful pregnancy- started having redness pain on lower abdomen 2 days ago - incision approximated - went to urgent care and was started on keflex yesterday - ; called OB here and told to come in for evaluation and treatment; client is breastfeeding

## 2019-11-09 NOTE — ED ADULT NURSE NOTE - CHPI ED NUR SYMPTOMS NEG
no diarrhea/no vomiting/no nausea/no abdominal distension/no blood in stool/no burning urination/no dysuria/no hematuria

## 2019-11-09 NOTE — ED PROVIDER NOTE - CLINICAL SUMMARY MEDICAL DECISION MAKING FREE TEXT BOX
30 y/o F  11 days post-op  presenting with cellulitis of incision. Will start on Unasyn and order blood cultures. WBC noted to be elevated to 17,000. H&H 11 and 35 respectively, lactate 1.3. Pt's -130's on arrival, now 112, blood pressure 121/68. Possible discharge. Discussed with Dr. Estevez, who will open wound in ED and examine for pus and collection. Dispo pending findings relating to I&D. 28 y/o F  11 days post-op  presenting with cellulitis of incision. Will start on Unasyn and order blood cultures. WBC noted to be elevated to 17,000. H&H 11 and 35 respectively, lactate 1.3. Pt's -130's on arrival, now 112, blood pressure 121/68. Per Dr Lopez pt stable for dc lacey. Wound opened in ED + pus and collection drained - wound packed will start on clindamycin - DC keflex - pt to be seen by dr lopez in 2 days for packing removal -no breastfeeding while on clindamycin dw pt

## 2019-11-09 NOTE — ED PROVIDER NOTE - PATIENT PORTAL LINK FT
You can access the FollowMyHealth Patient Portal offered by [facility] by registering at the following website: [email or web address]. By joining [facility]’s FollowMyHealth portal, you will also be able to view your health information using other applications (apps) compatible with our system.

## 2019-11-09 NOTE — ED PROVIDER NOTE - OBJECTIVE STATEMENT
30 y/o  F 11 days s/p  presenting with worsening pain redness, and swelling around incisional site, more prominent on right aspect. Pt was started on Keflex 1 day ago. No discrete fevers or chills. Pt reports questionable drainage around right lateral incisional site and fluctuance. Denies cough, SOB, leg swelling or calf tenderness. Pt spoke with her physician Dr. Estevez who referred pt to ED for further evaluation.

## 2019-11-09 NOTE — ED PROVIDER NOTE - MUSCULOSKELETAL, MLM
Spine appears normal, range of motion is not limited, no muscle or joint tenderness. No calf tenderness or leg swelling. Nml pulses.

## 2019-11-09 NOTE — ED ADULT TRIAGE NOTE - OTHER COMPLAINTS
pt s/p c section 15 days ago presents today with 3 days of increasing redness/pain/swelling to area. endorses chills at home. no cp/sob.

## 2019-11-09 NOTE — CONSULT NOTE ADULT - ASSESSMENT
29y  POD#15 s/p c/s on 10/24 presenting with x3 days of swelling and redness around incision site, found to have superficial SSI   -incision was incised and drained, culture sent which will be followed up   -infection is superficial, fascia intact throughout incision   -vitals stable and WBC trending down   -stable for discharge home  -will continue keflex, adding clindamycin as well  -has appt with Dr. Piedra on Monday, will have packing replaced then  -given supplies for changing outside abdominal dressing  -told to return with fevers, chills, severe pain, increasing swelling or redness of site     Patient seen by Isra PGY2 and Dr. Estevez

## 2019-11-10 LAB
GRAM STN FLD: SIGNIFICANT CHANGE UP
SPECIMEN SOURCE: SIGNIFICANT CHANGE UP

## 2019-11-11 LAB
-  CEFAZOLIN: SIGNIFICANT CHANGE UP
-  CLINDAMYCIN: SIGNIFICANT CHANGE UP
-  ERYTHROMYCIN: SIGNIFICANT CHANGE UP
-  LINEZOLID: SIGNIFICANT CHANGE UP
-  OXACILLIN: SIGNIFICANT CHANGE UP
-  PENICILLIN: SIGNIFICANT CHANGE UP
-  RIFAMPIN: SIGNIFICANT CHANGE UP
-  TRIMETHOPRIM/SULFAMETHOXAZOLE: SIGNIFICANT CHANGE UP
-  VANCOMYCIN: SIGNIFICANT CHANGE UP
CULTURE RESULTS: SIGNIFICANT CHANGE UP
METHOD TYPE: SIGNIFICANT CHANGE UP
ORGANISM # SPEC MICROSCOPIC CNT: SIGNIFICANT CHANGE UP
ORGANISM # SPEC MICROSCOPIC CNT: SIGNIFICANT CHANGE UP
SPECIMEN SOURCE: SIGNIFICANT CHANGE UP

## 2019-11-14 LAB
CULTURE RESULTS: SIGNIFICANT CHANGE UP
CULTURE RESULTS: SIGNIFICANT CHANGE UP
SPECIMEN SOURCE: SIGNIFICANT CHANGE UP
SPECIMEN SOURCE: SIGNIFICANT CHANGE UP

## 2020-09-01 NOTE — ED PROVIDER NOTE - NSFOLLOWUPINSTRUCTIONS_ED_ALL_ED_FT
Order placed   Cellulitis, Adult  Image   Cellulitis is a skin infection. The infected area is usually warm, red, swollen, and tender. This condition occurs most often in the arms and lower legs. The infection can travel to the muscles, blood, and underlying tissue and become serious. It is very important to get treated for this condition.  What are the causes?  Cellulitis is caused by bacteria. The bacteria enter through a break in the skin, such as a cut, burn, insect bite, open sore, or crack.  What increases the risk?  This condition is more likely to occur in people who:  Have a weak body defense system (immune system).Have open wounds on the skin, such as cuts, burns, bites, and scrapes. Bacteria can enter the body through these open wounds.Are older than 60 years of age.Have diabetes.Have a type of long-lasting (chronic) liver disease (cirrhosis) or kidney disease.Are obese.Have a skin condition such as:  Itchy rash (eczema).Slow movement of blood in the veins (venous stasis).Fluid buildup below the skin (edema).Have had radiation therapy.Use IV drugs.What are the signs or symptoms?  Symptoms of this condition include:  Redness, streaking, or spotting on the skin.Swollen area of the skin.Tenderness or pain when an area of the skin is touched.Warm skin.A fever.Chills.Blisters.How is this diagnosed?  This condition is diagnosed based on a medical history and physical exam. You may also have tests, including:  Blood tests.Imaging tests.How is this treated?  Treatment for this condition may include:  Medicines, such as antibiotic medicines or medicines to treat allergies (antihistamines).Supportive care, such as rest and application of cold or warm cloths (compresses) to the skin.Hospital care, if the condition is severe.The infection usually starts to get better within 1–2 days of treatment.  Follow these instructions at home:  Image   Medicines     Take over-the-counter and prescription medicines only as told by your health care provider.If you were prescribed an antibiotic medicine, take it as told by your health care provider. Do not stop taking the antibiotic even if you start to feel better.General instructions     Drink enough fluid to keep your urine pale yellow.Do not touch or rub the infected area.Raise (elevate) the infected area above the level of your heart while you are sitting or lying down.Apply warm or cold compresses to the affected area as told by your health care provider.Keep all follow-up visits as told by your health care provider. This is important. These visits let your health care provider make sure a more serious infection is not developing.Contact a health care provider if:  You have a fever.Your symptoms do not begin to improve within 1–2 days of starting treatment.Your bone or joint underneath the infected area becomes painful after the skin has healed.Your infection returns in the same area or another area.You notice a swollen bump in the infected area.You develop new symptoms.You have a general ill feeling (malaise) with muscle aches and pains.Get help right away if:  Your symptoms get worse.You feel very sleepy.You develop vomiting or diarrhea that persists.You notice red streaks coming from the infected area.Your red area gets larger or turns dark in color.These symptoms may represent a serious problem that is an emergency. Do not wait to see if the symptoms will go away. Get medical help right away. Call your local emergency services (911 in the [place] Do not drive yourself to the hospital.   Summary  Cellulitis is a skin infection. This condition occurs most often in the arms and lower legs.Treatment for this condition may include medicines, such as antibiotic medicines or antihistamines.Take over-the-counter and prescription medicines only as told by your health care provider. If you were prescribed an antibiotic medicine, do not stop taking the antibiotic even if you start to feel better.Contact a health care provider if your symptoms do not begin to improve within 1–2 days of starting treatment or your symptoms get worse.Keep all follow-up visits as told by your health care provider. This is important. These visits let your health care provider make sure that a more serious infection is not developing.This information is not intended to replace advice given to you by your health care provider. Make sure you discuss any questions you have with your health care provider.    Document Released: 09/27/2006 Document Revised: 05/09/2019 Document Reviewed: 05/09/2019  [employer] Interactive Patient Education © 2019 [employer]

## 2021-08-27 PROBLEM — Z34.90 ENCOUNTER FOR SUPERVISION OF NORMAL PREGNANCY, UNSPECIFIED, UNSPECIFIED TRIMESTER: Chronic | Status: ACTIVE | Noted: 2019-11-09

## 2021-08-28 ENCOUNTER — INPATIENT (INPATIENT)
Facility: HOSPITAL | Age: 31
LOS: 1 days | Discharge: HOME | End: 2021-08-30
Attending: OBSTETRICS & GYNECOLOGY | Admitting: OBSTETRICS & GYNECOLOGY
Payer: COMMERCIAL

## 2021-08-28 VITALS — HEIGHT: 63.78 IN | WEIGHT: 147.71 LBS

## 2021-08-28 DIAGNOSIS — Z98.891 HISTORY OF UTERINE SCAR FROM PREVIOUS SURGERY: Chronic | ICD-10-CM

## 2021-08-28 LAB
BASOPHILS # BLD AUTO: 0.04 K/UL — SIGNIFICANT CHANGE UP (ref 0–0.2)
BASOPHILS NFR BLD AUTO: 0.3 % — SIGNIFICANT CHANGE UP (ref 0–2)
BLD GP AB SCN SERPL QL: NEGATIVE — SIGNIFICANT CHANGE UP
EOSINOPHIL # BLD AUTO: 0.07 K/UL — SIGNIFICANT CHANGE UP (ref 0–0.5)
EOSINOPHIL NFR BLD AUTO: 0.5 % — SIGNIFICANT CHANGE UP (ref 0–6)
HCT VFR BLD CALC: 34.8 % — SIGNIFICANT CHANGE UP (ref 34.5–45)
HGB BLD-MCNC: 11.6 G/DL — SIGNIFICANT CHANGE UP (ref 11.5–15.5)
IMM GRANULOCYTES NFR BLD AUTO: 0.8 % — SIGNIFICANT CHANGE UP (ref 0–1.5)
LYMPHOCYTES # BLD AUTO: 17.9 % — SIGNIFICANT CHANGE UP (ref 13–44)
LYMPHOCYTES # BLD AUTO: 2.33 K/UL — SIGNIFICANT CHANGE UP (ref 1–3.3)
MCHC RBC-ENTMCNC: 28.7 PG — SIGNIFICANT CHANGE UP (ref 27–34)
MCHC RBC-ENTMCNC: 33.3 GM/DL — SIGNIFICANT CHANGE UP (ref 32–36)
MCV RBC AUTO: 86.1 FL — SIGNIFICANT CHANGE UP (ref 80–100)
MONOCYTES # BLD AUTO: 0.94 K/UL — HIGH (ref 0–0.9)
MONOCYTES NFR BLD AUTO: 7.2 % — SIGNIFICANT CHANGE UP (ref 2–14)
NEUTROPHILS # BLD AUTO: 9.53 K/UL — HIGH (ref 1.8–7.4)
NEUTROPHILS NFR BLD AUTO: 73.3 % — SIGNIFICANT CHANGE UP (ref 43–77)
NRBC # BLD: 0 /100 WBCS — SIGNIFICANT CHANGE UP (ref 0–0)
PLATELET # BLD AUTO: 212 K/UL — SIGNIFICANT CHANGE UP (ref 150–400)
RBC # BLD: 4.04 M/UL — SIGNIFICANT CHANGE UP (ref 3.8–5.2)
RBC # FLD: 13.4 % — SIGNIFICANT CHANGE UP (ref 10.3–14.5)
RH IG SCN BLD-IMP: POSITIVE — SIGNIFICANT CHANGE UP
RH IG SCN BLD-IMP: POSITIVE — SIGNIFICANT CHANGE UP
SARS-COV-2 RNA SPEC QL NAA+PROBE: SIGNIFICANT CHANGE UP
WBC # BLD: 13.01 K/UL — HIGH (ref 3.8–10.5)
WBC # FLD AUTO: 13.01 K/UL — HIGH (ref 3.8–10.5)

## 2021-08-28 RX ORDER — OXYTOCIN 10 UNIT/ML
333.33 VIAL (ML) INJECTION
Qty: 20 | Refills: 0 | Status: DISCONTINUED | OUTPATIENT
Start: 2021-08-28 | End: 2021-08-30

## 2021-08-28 RX ORDER — SODIUM CHLORIDE 9 MG/ML
1000 INJECTION, SOLUTION INTRAVENOUS
Refills: 0 | Status: DISCONTINUED | OUTPATIENT
Start: 2021-08-28 | End: 2021-08-30

## 2021-08-28 RX ORDER — TETANUS TOXOID, REDUCED DIPHTHERIA TOXOID AND ACELLULAR PERTUSSIS VACCINE, ADSORBED 5; 2.5; 8; 8; 2.5 [IU]/.5ML; [IU]/.5ML; UG/.5ML; UG/.5ML; UG/.5ML
0.5 SUSPENSION INTRAMUSCULAR ONCE
Refills: 0 | Status: DISCONTINUED | OUTPATIENT
Start: 2021-08-28 | End: 2021-08-30

## 2021-08-28 RX ORDER — DEXAMETHASONE 0.5 MG/5ML
4 ELIXIR ORAL EVERY 6 HOURS
Refills: 0 | Status: DISCONTINUED | OUTPATIENT
Start: 2021-08-28 | End: 2021-08-30

## 2021-08-28 RX ORDER — MAGNESIUM HYDROXIDE 400 MG/1
30 TABLET, CHEWABLE ORAL
Refills: 0 | Status: DISCONTINUED | OUTPATIENT
Start: 2021-08-28 | End: 2021-08-30

## 2021-08-28 RX ORDER — IBUPROFEN 200 MG
600 TABLET ORAL EVERY 6 HOURS
Refills: 0 | Status: COMPLETED | OUTPATIENT
Start: 2021-08-28 | End: 2022-07-27

## 2021-08-28 RX ORDER — NALOXONE HYDROCHLORIDE 4 MG/.1ML
0.1 SPRAY NASAL
Refills: 0 | Status: DISCONTINUED | OUTPATIENT
Start: 2021-08-28 | End: 2021-08-30

## 2021-08-28 RX ORDER — OXYCODONE HYDROCHLORIDE 5 MG/1
5 TABLET ORAL ONCE
Refills: 0 | Status: DISCONTINUED | OUTPATIENT
Start: 2021-08-28 | End: 2021-08-30

## 2021-08-28 RX ORDER — CITRIC ACID/SODIUM CITRATE 300-500 MG
15 SOLUTION, ORAL ORAL EVERY 6 HOURS
Refills: 0 | Status: DISCONTINUED | OUTPATIENT
Start: 2021-08-28 | End: 2021-08-28

## 2021-08-28 RX ORDER — LANOLIN
1 OINTMENT (GRAM) TOPICAL EVERY 6 HOURS
Refills: 0 | Status: DISCONTINUED | OUTPATIENT
Start: 2021-08-28 | End: 2021-08-30

## 2021-08-28 RX ORDER — ACETAMINOPHEN 500 MG
975 TABLET ORAL EVERY 6 HOURS
Refills: 0 | Status: DISCONTINUED | OUTPATIENT
Start: 2021-08-28 | End: 2021-08-30

## 2021-08-28 RX ORDER — OXYTOCIN 10 UNIT/ML
2 VIAL (ML) INJECTION
Qty: 30 | Refills: 0 | Status: DISCONTINUED | OUTPATIENT
Start: 2021-08-28 | End: 2021-08-28

## 2021-08-28 RX ORDER — OXYTOCIN 10 UNIT/ML
333.33 VIAL (ML) INJECTION
Qty: 20 | Refills: 0 | Status: DISCONTINUED | OUTPATIENT
Start: 2021-08-28 | End: 2021-08-28

## 2021-08-28 RX ORDER — ENOXAPARIN SODIUM 100 MG/ML
40 INJECTION SUBCUTANEOUS EVERY 24 HOURS
Refills: 0 | Status: DISCONTINUED | OUTPATIENT
Start: 2021-08-29 | End: 2021-08-30

## 2021-08-28 RX ORDER — KETOROLAC TROMETHAMINE 30 MG/ML
30 SYRINGE (ML) INJECTION EVERY 6 HOURS
Refills: 0 | Status: DISCONTINUED | OUTPATIENT
Start: 2021-08-28 | End: 2021-08-29

## 2021-08-28 RX ORDER — OXYCODONE HYDROCHLORIDE 5 MG/1
5 TABLET ORAL
Refills: 0 | Status: DISCONTINUED | OUTPATIENT
Start: 2021-08-28 | End: 2021-08-30

## 2021-08-28 RX ORDER — FENTANYL/BUPIVACAINE/NS/PF 2MCG/ML-.1
250 PLASTIC BAG, INJECTION (ML) INJECTION
Refills: 0 | Status: DISCONTINUED | OUTPATIENT
Start: 2021-08-28 | End: 2021-08-28

## 2021-08-28 RX ORDER — SODIUM CHLORIDE 9 MG/ML
1000 INJECTION, SOLUTION INTRAVENOUS
Refills: 0 | Status: DISCONTINUED | OUTPATIENT
Start: 2021-08-28 | End: 2021-08-28

## 2021-08-28 RX ORDER — SIMETHICONE 80 MG/1
80 TABLET, CHEWABLE ORAL EVERY 4 HOURS
Refills: 0 | Status: DISCONTINUED | OUTPATIENT
Start: 2021-08-28 | End: 2021-08-30

## 2021-08-28 RX ORDER — DIPHENHYDRAMINE HCL 50 MG
25 CAPSULE ORAL EVERY 6 HOURS
Refills: 0 | Status: DISCONTINUED | OUTPATIENT
Start: 2021-08-28 | End: 2021-08-30

## 2021-08-28 RX ORDER — ONDANSETRON 8 MG/1
4 TABLET, FILM COATED ORAL EVERY 6 HOURS
Refills: 0 | Status: DISCONTINUED | OUTPATIENT
Start: 2021-08-28 | End: 2021-08-30

## 2021-08-28 RX ADMIN — Medication 30 MILLIGRAM(S): at 23:19

## 2021-08-28 RX ADMIN — SODIUM CHLORIDE 125 MILLILITER(S): 9 INJECTION, SOLUTION INTRAVENOUS at 18:10

## 2021-08-28 RX ADMIN — Medication 2 MILLIUNIT(S)/MIN: at 20:18

## 2021-08-28 NOTE — PATIENT PROFILE OB - ALERT: PERTINENT HISTORY
1st Trimester Sonogram/20 Week Level II Sonogram/Follow up Sonogram for Growth/Non Invasive Prenatal Screen (NIPS)/Fetal Non-Stress Test (NST)

## 2021-08-28 NOTE — PATIENT PROFILE OB - FUNCTIONAL SCREEN CURRENT LEVEL: TRANSFERRING, MLM
After Visit Summary   9/5/2018    Angeli Jacobson    MRN: [id number]           Patient Information     Date Of Birth          1952        Visit Information        Provider Department      9/5/2018 1:10 PM Carmen Zarate APRN CNP [facility]        Today's Diagnoses     Leg weakness, bilateral    -  1      Care Instructions    1. Please call [facility] to have lumbar and thoracic MRI completed. Call [phone] to schedule your scan.  I will contact you with results.           Follow-ups after your visit        Future tests that were ordered for you today     Open Future Orders        Priority Expected Expires Ordered    MR Thoracic Spine w/o Contrast Routine  9/5/2019 9/5/2018    MR Lumbar Spine w/o Contrast Routine  9/5/2019 9/5/2018            Who to contact     If you have questions or need follow up information about today's clinic visit or your schedule please contact [facility] directly at [phone].  Normal or non-critical lab and imaging results will be communicated to you by [employer], letter or phone within 4 business days after the clinic has received the results. If you do not hear from us within 7 days, please contact the clinic through [employer] or phone. If you have a critical or abnormal lab result, we will notify you by phone as soon as possible.  Submit refill requests through [employer] or call your pharmacy and they will forward the refill request to us. Please allow 3 business days for your refill to be completed.          Additional Information About Your Visit        Etsyhart Information     [employer] gives you secure access to your electronic health record. If you see a primary care provider, you can also send messages to your care team and make appointments. If you have questions, please call your primary care clinic.  If you do not have a primary care provider, please call [phone] and they will assist you.         Care EveryWhere ID     This is your Care EveryWhere ID. This could be used by other organizations to access your [facility] medical records  [id number]        Your Vitals Were     Pulse Last Period Pulse Oximetry             74 03/18/2005 94%          Blood Pressure from Last 3 Encounters:   09/05/18 113/79   08/28/18 112/72   08/01/18 110/68    Weight from Last 3 Encounters:   08/28/18 149 lb (67.6 kg)   08/01/18 154 lb (69.9 kg)   05/22/18 154 lb (69.9 kg)               Primary Care Provider Office Phone # Fax #    Sepideh Burris PA-C [phone]       [address] [facility] [address]        Equal Access to Services     KERON OCASIO : Hadii aad ku hadasho Soomaali, waaxda luqadaha, qaybta kaalmada adeegyada, radames hatch . So [facility] [phone].    ATENCIÓN: Si habla español, tiene a carver disposición servicios gratuitos de asistencia lingüística. [facility] [phone].    We comply with applicable federal civil rights laws and [place] laws. We do not discriminate on the basis of race, color, national origin, age, disability, sex, sexual orientation, or gender identity.            Thank you!     Thank you for choosing [facility]  for your care. Our goal is always to provide you with excellent care. Hearing back from our patients is one way we can continue to improve our services. Please take a few minutes to complete the written survey that you may receive in the mail after your visit with us. Thank you!             Your Updated Medication List - Protect others around you: Learn how to safely use, store and throw away your medicines at www.disposemymeds.org.          This list is accurate as of 9/5/18  1:26 PM.  Always use your most recent med list.                   Brand Name Dispense Instructions for use Diagnosis    alendronate 70 MG tablet    FOSAMAX    12 tablet    Take 1 tablet (70 mg) by mouth with 8oz water every 7 days 30 minutes  before breakfast and remain upright during this time.    Age-related osteoporosis without current pathological fracture       buPROPion 300 MG 24 hr tablet    WELLBUTRIN XL    90 tablet    Take 1 tablet (300 mg) by mouth every morning    Major depression in complete remission (H)       ezetimibe 10 MG tablet    ZETIA    90 tablet    Take 0.5 tablets (5 mg) by mouth daily    Hyperlipidemia LDL goal <70       fluticasone 50 MCG/ACT spray    FLONASE    1 Bottle    Spray 1-2 sprays into both nostrils daily    Seasonal allergic rhinitis, unspecified chronicity, unspecified trigger       Multi-vitamin Tabs tablet   Generic drug:  multivitamin, therapeutic with minerals     30    1 TABLET DAILY    Routine general medical examination at a health care facility       order for DME     2 Device    Equipment being ordered: lymphedema bandages    Personal history of malignant neoplasm of other site, Other lymphedema       order for DME     1 each    Equipment being ordered: Left Thigh High Compression Stocking, 550 CCL.3    Myxoid liposarcoma (H)       rivaroxaban ANTICOAGULANT 20 MG Tabs tablet    XARELTO    90 tablet    Take 1 tablet (20 mg) by mouth daily (with dinner) Start after completing 15 mg tablets    Vascular graft occlusion, initial encounter (H)       rosuvastatin 40 MG tablet    CRESTOR    90 tablet    Take 1 tablet (40 mg) by mouth At Bedtime    Hyperlipidemia LDL goal <70          0 = independent

## 2021-08-29 LAB
BASOPHILS # BLD AUTO: 0.02 K/UL — SIGNIFICANT CHANGE UP (ref 0–0.2)
BASOPHILS NFR BLD AUTO: 0.1 % — SIGNIFICANT CHANGE UP (ref 0–2)
COVID-19 SPIKE DOMAIN AB INTERP: NEGATIVE — SIGNIFICANT CHANGE UP
COVID-19 SPIKE DOMAIN ANTIBODY RESULT: 0.4 U/ML — SIGNIFICANT CHANGE UP
EOSINOPHIL # BLD AUTO: 0 K/UL — SIGNIFICANT CHANGE UP (ref 0–0.5)
EOSINOPHIL NFR BLD AUTO: 0 % — SIGNIFICANT CHANGE UP (ref 0–6)
HCT VFR BLD CALC: 36.8 % — SIGNIFICANT CHANGE UP (ref 34.5–45)
HGB BLD-MCNC: 12 G/DL — SIGNIFICANT CHANGE UP (ref 11.5–15.5)
IMM GRANULOCYTES NFR BLD AUTO: 0.6 % — SIGNIFICANT CHANGE UP (ref 0–1.5)
LYMPHOCYTES # BLD AUTO: 1.16 K/UL — SIGNIFICANT CHANGE UP (ref 1–3.3)
LYMPHOCYTES # BLD AUTO: 6.4 % — LOW (ref 13–44)
MCHC RBC-ENTMCNC: 29.1 PG — SIGNIFICANT CHANGE UP (ref 27–34)
MCHC RBC-ENTMCNC: 32.6 GM/DL — SIGNIFICANT CHANGE UP (ref 32–36)
MCV RBC AUTO: 89.1 FL — SIGNIFICANT CHANGE UP (ref 80–100)
MONOCYTES # BLD AUTO: 0.57 K/UL — SIGNIFICANT CHANGE UP (ref 0–0.9)
MONOCYTES NFR BLD AUTO: 3.2 % — SIGNIFICANT CHANGE UP (ref 2–14)
NEUTROPHILS # BLD AUTO: 16.2 K/UL — HIGH (ref 1.8–7.4)
NEUTROPHILS NFR BLD AUTO: 89.7 % — HIGH (ref 43–77)
NRBC # BLD: 0 /100 WBCS — SIGNIFICANT CHANGE UP (ref 0–0)
PLATELET # BLD AUTO: 221 K/UL — SIGNIFICANT CHANGE UP (ref 150–400)
RBC # BLD: 4.13 M/UL — SIGNIFICANT CHANGE UP (ref 3.8–5.2)
RBC # FLD: 13.5 % — SIGNIFICANT CHANGE UP (ref 10.3–14.5)
SARS-COV-2 IGG+IGM SERPL QL IA: 0.4 U/ML — SIGNIFICANT CHANGE UP
SARS-COV-2 IGG+IGM SERPL QL IA: NEGATIVE — SIGNIFICANT CHANGE UP
T PALLIDUM AB TITR SER: NEGATIVE — SIGNIFICANT CHANGE UP
WBC # BLD: 18.06 K/UL — HIGH (ref 3.8–10.5)
WBC # FLD AUTO: 18.06 K/UL — HIGH (ref 3.8–10.5)

## 2021-08-29 RX ORDER — IBUPROFEN 200 MG
600 TABLET ORAL EVERY 6 HOURS
Refills: 0 | Status: DISCONTINUED | OUTPATIENT
Start: 2021-08-29 | End: 2021-08-30

## 2021-08-29 RX ADMIN — Medication 600 MILLIGRAM(S): at 19:20

## 2021-08-29 RX ADMIN — Medication 30 MILLIGRAM(S): at 12:55

## 2021-08-29 RX ADMIN — Medication 975 MILLIGRAM(S): at 02:55

## 2021-08-29 RX ADMIN — Medication 600 MILLIGRAM(S): at 18:30

## 2021-08-29 RX ADMIN — Medication 975 MILLIGRAM(S): at 16:01

## 2021-08-29 RX ADMIN — Medication 975 MILLIGRAM(S): at 16:50

## 2021-08-29 RX ADMIN — ENOXAPARIN SODIUM 40 MILLIGRAM(S): 100 INJECTION SUBCUTANEOUS at 12:54

## 2021-08-29 RX ADMIN — Medication 975 MILLIGRAM(S): at 10:55

## 2021-08-29 RX ADMIN — Medication 975 MILLIGRAM(S): at 10:01

## 2021-08-29 RX ADMIN — Medication 975 MILLIGRAM(S): at 04:00

## 2021-08-29 RX ADMIN — Medication 30 MILLIGRAM(S): at 13:39

## 2021-08-29 RX ADMIN — Medication 975 MILLIGRAM(S): at 21:40

## 2021-08-29 RX ADMIN — SIMETHICONE 80 MILLIGRAM(S): 80 TABLET, CHEWABLE ORAL at 18:31

## 2021-08-29 RX ADMIN — Medication 975 MILLIGRAM(S): at 21:08

## 2021-08-29 NOTE — PROGRESS NOTE ADULT - ASSESSMENT
31y Female POD# 1s/p urgent repeat C/S for NRFHT while attempting TOLAC, Uncomplicated c/s. EBL 800cc                                        1. Neuro/Pain:  toradol atc, tylenol atc, oxy prn  2  CV:  VS per routine, AM CBC pending  3. Pulm: Encourage ISS & Ambulation  4. GI:  Regular   5. : Finn in place, to be removed this morning, TOV this afternoon  6. DVT ppx: SCDs, lovenox qday   7. Dispo: POD #3

## 2021-08-29 NOTE — LACTATION INITIAL EVALUATION - NS LACT CON REASON FOR REQ
Pt. is a P2 at 40.3 wks. gestation via repeat .  Pt. has hx of hypothyroid and was on Synthroid.  discussed with pt. to follow up with endocrinologist to monitor thyroid levels.  Baby is 21 hrs old has had 1 void, 1 stool.   Pt. has baby at breast in cradle hold.  Pt. states she can feel baby's tongue pushing out when she is latching baby to breast.  Pt. states baby will latch and is able to sustain sucking but initially latching baby needs time.  Pt. aware of lingual frenulum and lingual frenulum can be visualized and palpated.  Upon oral exam tongue is disorganized and uncoordinated.  With suck training exercises tongue began to cup finger slightly.  Baby returned to pt. and she was able to latch baby to breast.  Baby was able to achieve a deep latch, able to maintain consistent sucking for approx. 7 minutes.  Pt. denied pain, biting or pinching.  Discussed with pt. lingual frenulum does not appear to interfere with breastfeeding, baby does need to coordinate tongue movements and appears to be improving as baby continues to practice latching.  As mentioned before tongue does respond with suck training exercises./multiparous mom Pt. is a P2 at 40.3 wks. gestation via repeat .  Pt. has hx of hypothyroid and was on Synthroid.  Discussed with pt. to follow up with endocrinologist to monitor thyroid levels.  Baby is 21 hrs old has had 1 void, 1 stool.   Pt. has baby at breast in cradle hold.  Pt. states she can feel baby's tongue pushing out when she is latching baby to breast.  Pt. states baby will latch and is able to sustain sucking but initially latching baby needs time.  Pt. aware of lingual frenulum and lingual frenulum can be visualized and palpated.  Upon oral exam tongue is disorganized and uncoordinated.  With suck training exercises tongue began to cup finger slightly.  Baby returned to pt. and she was able to latch baby to breast.  Baby was able to achieve a deep latch, able to maintain consistent sucking for approx. 7 minutes.  Pt. denied pain, biting or pinching.  Discussed with pt. lingual frenulum does not appear to interfere with breastfeeding, baby does need to coordinate tongue movements and appears to be improving as baby continues to practice latching.  As mentioned before tongue does respond with suck training exercises.   Reviewed with pt. to encourage as much skin to skin , breastfeed 8-12x/24 hrs., monitor voids and stools.  Encouraged pt. to review Guide to Post Partum and  Care  located in folder./multiparous mom

## 2021-08-29 NOTE — LACTATION INITIAL EVALUATION - AS EVIDENCED BY
lingual frenulum noted but not interfering with breastfeeding at this time/patient stated/observation

## 2021-08-29 NOTE — LACTATION INITIAL EVALUATION - ORAL/MOTOR ACTIVITY
tongue uncoordinated, will suck briefly but does not sustain suck on finger, unable to cup finger, some lateral movement of tongue with body but not tip of tongue, partial peristalsis noted, able to extend tongue over lower lip,  lingual frenulum palpated and observed, tongue slightly responsive to suck training but overall remains uncoordinated./disorganized tongue

## 2021-08-29 NOTE — PROGRESS NOTE ADULT - SUBJECTIVE AND OBJECTIVE BOX
Patient evaluated at bedside.   She reports pain is well controlled. Finn in place. No Flatus yet.  Not OOB yet.  She denies HA, dizziness, CP, palpitations, SOB, n/v, or heavy vaginal bleeding.    Physical Exam:  Vital Signs Last 24 Hrs  T(C): 36.8 (29 Aug 2021 01:00), Max: 36.8 (29 Aug 2021 01:00)  T(F): 98.2 (29 Aug 2021 01:00), Max: 98.2 (29 Aug 2021 01:00)  HR: 80 (29 Aug 2021 01:00) (80 - 93)  BP: 99/60 (29 Aug 2021 01:00) (96/59 - 106/59)  BP(mean): --  RR: 16 (29 Aug 2021 01:00) (16 - 20)  SpO2: 97% (29 Aug 2021 01:00) (97% - 100%)    Gen: NAD  Abd: + BS, soft, nontender, nondistended, no rebound or guarding  Incision clean, dry and intact  uterus firm at midline  : lochia WNL  Extremities: no swelling or calf tenderness                          12.0   18.06 )-----------( 221      ( 29 Aug 2021 06:36 )             36.8

## 2021-08-30 VITALS
HEART RATE: 78 BPM | SYSTOLIC BLOOD PRESSURE: 90 MMHG | OXYGEN SATURATION: 100 % | RESPIRATION RATE: 18 BRPM | DIASTOLIC BLOOD PRESSURE: 56 MMHG | TEMPERATURE: 97 F

## 2021-08-30 PROCEDURE — 86780 TREPONEMA PALLIDUM: CPT

## 2021-08-30 PROCEDURE — 88307 TISSUE EXAM BY PATHOLOGIST: CPT | Mod: 26

## 2021-08-30 PROCEDURE — 86850 RBC ANTIBODY SCREEN: CPT

## 2021-08-30 PROCEDURE — 86900 BLOOD TYPING SEROLOGIC ABO: CPT

## 2021-08-30 PROCEDURE — 59025 FETAL NON-STRESS TEST: CPT

## 2021-08-30 PROCEDURE — 85025 COMPLETE CBC W/AUTO DIFF WBC: CPT

## 2021-08-30 PROCEDURE — 86901 BLOOD TYPING SEROLOGIC RH(D): CPT

## 2021-08-30 PROCEDURE — 36415 COLL VENOUS BLD VENIPUNCTURE: CPT

## 2021-08-30 PROCEDURE — 87635 SARS-COV-2 COVID-19 AMP PRB: CPT

## 2021-08-30 PROCEDURE — 59050 FETAL MONITOR W/REPORT: CPT

## 2021-08-30 PROCEDURE — 86769 SARS-COV-2 COVID-19 ANTIBODY: CPT

## 2021-08-30 PROCEDURE — 88307 TISSUE EXAM BY PATHOLOGIST: CPT

## 2021-08-30 RX ORDER — IBUPROFEN 200 MG
1 TABLET ORAL
Qty: 0 | Refills: 0 | DISCHARGE
Start: 2021-08-30

## 2021-08-30 RX ORDER — ACETAMINOPHEN 500 MG
3 TABLET ORAL
Qty: 0 | Refills: 0 | DISCHARGE
Start: 2021-08-30

## 2021-08-30 RX ADMIN — SIMETHICONE 80 MILLIGRAM(S): 80 TABLET, CHEWABLE ORAL at 06:36

## 2021-08-30 RX ADMIN — ENOXAPARIN SODIUM 40 MILLIGRAM(S): 100 INJECTION SUBCUTANEOUS at 12:04

## 2021-08-30 RX ADMIN — Medication 600 MILLIGRAM(S): at 12:01

## 2021-08-30 RX ADMIN — Medication 975 MILLIGRAM(S): at 08:29

## 2021-08-30 NOTE — DISCHARGE NOTE OB - MEDICATION SUMMARY - MEDICATIONS TO STOP TAKING
I will STOP taking the medications listed below when I get home from the hospital:    Cleocin HCl 300 mg oral capsule  -- 1 cap(s) by mouth every 6 hours   -- Finish all this medication unless otherwise directed by prescriber.  Medication should be taken with plenty of water.

## 2021-08-30 NOTE — DISCHARGE NOTE OB - CARE PROVIDER_API CALL
Kiana Quach (DO; MS)  [facility]  [facility] [address] [place] [address]  Phone: [phone]  Fax: [phone]  Follow Up Time: 1 week

## 2021-08-30 NOTE — DISCHARGE NOTE OB - MEDICATION SUMMARY - MEDICATIONS TO TAKE
I will START or STAY ON the medications listed below when I get home from the hospital:    acetaminophen 325 mg oral tablet  -- 3 tab(s) by mouth every 6 hours  -- Indication: For Pain    ibuprofen 600 mg oral tablet  -- 1 tab(s) by mouth every 6 hours  -- Indication: For Pain

## 2021-08-30 NOTE — DISCHARGE NOTE OB - PATIENT PORTAL LINK FT
You can access the FollowMyHealth Patient Portal offered by [facility] by registering at the following website: [email or web address]. By joining [employer]’s FollowMyHealth portal, you will also be able to view your health information using other applications (apps) compatible with our system.

## 2021-08-30 NOTE — DISCHARGE NOTE OB - MATERIALS PROVIDED
Vaccinations/[facility]  Screening Program/  Immunization Record/Breastfeeding Log/Guide to Postpartum Care/[facility] Hearing Screen Program/Back To Sleep Handout/Shaken Baby Prevention Handout/Breastfeeding Guide and Packet/Birth Certificate Instructions/Tdap Vaccination (VIS Pub Date: 2012)

## 2021-08-30 NOTE — DISCHARGE NOTE OB - HOSPITAL COURSE
Admitted in early labor, for TOLAC.  Delivered via emergency  section due to NRFHT.  Uncomplicated surgery and postpartum course.

## 2021-09-02 DIAGNOSIS — Z34.83 ENCOUNTER FOR SUPERVISION OF OTHER NORMAL PREGNANCY, THIRD TRIMESTER: ICD-10-CM

## 2021-09-02 DIAGNOSIS — Z3A.40 40 WEEKS GESTATION OF PREGNANCY: ICD-10-CM

## 2021-09-02 DIAGNOSIS — O34.211 MATERNAL CARE FOR LOW TRANSVERSE SCAR FROM PREVIOUS CESAREAN DELIVERY: ICD-10-CM

## 2021-09-02 DIAGNOSIS — E03.9 HYPOTHYROIDISM, UNSPECIFIED: ICD-10-CM

## 2021-09-10 LAB — SURGICAL PATHOLOGY STUDY: SIGNIFICANT CHANGE UP

## 2021-09-22 NOTE — LACTATION INITIAL EVALUATION - SUCCESSFUL BREASTFEEDING
Denies any side effects. Working well for her. Does notice when she does not take the medication.   Continue methylphenidate ER 18 mg daily, no changes recommended  first child for 10 months/yes

## 2022-06-08 NOTE — ED PROVIDER NOTE - CARE PROVIDER_API CALL
Problem: Neurological Deficit  Goal: Neurological status is stable or improving  Description: Interventions:  - Monitor and assess patient's level of consciousness, motor function, sensory function, and level of assistance needed for ADLs  - Monitor and report changes from baseline  Collaborate with interdisciplinary team to initiate plan and implement interventions as ordered  - Provide and maintain a safe environment  - Consider seizure precautions  - Consider fall precautions  - Consider aspiration precautions  - Consider bleeding precautions  Outcome: Progressing     Problem: Activity Intolerance/Impaired Mobility  Goal: Mobility/activity is maintained at optimum level for patient  Description: Interventions:  - Assess and monitor patient  barriers to mobility and need for assistive/adaptive devices  - Assess patient's emotional response to limitations  - Collaborate with interdisciplinary team and initiate plans and interventions as ordered  - Encourage independent activity per ability   - Maintain proper body alignment  - Perform active/passive rom as tolerated/ordered  - Plan activities to conserve energy   - Turn patient as appropriate  Outcome: Progressing     Problem: Communication Impairment  Goal: Ability to express needs and understand communication  Description: Assess patient's communication skills and ability to understand information  Patient will demonstrate use of effective communication techniques, alternative methods of communication and understanding even if not able to speak  - Encourage communication and provide alternate methods of communication as needed  - Collaborate with case management/ for discharge needs  - Include patient/family/caregiver in decisions related to communication    Outcome: Progressing     Problem: Potential for Aspiration  Goal: Non-ventilated patient's risk of aspiration is minimized  Description: Assess and monitor vital signs, respiratory status, and labs (WBC)  Monitor for signs of aspiration (tachypnea, cough, rales, wheezing, cyanosis, fever)  - Assess and monitor patient's ability to swallow  - Place patient up in chair to eat if possible  - HOB up at 90 degrees to eat if unable to get patient up into chair   - Supervise patient during oral intake  - Instruct patient/ family to take small bites  - Instruct patient/ family to take small single sips when taking liquids  - Follow patient-specific strategies generated by speech pathologist   Outcome: Progressing  Goal: Ventilated patient's risk of aspiration is minimized  Description: Assess and monitor vital signs, respiratory status, airway cuff pressure, and labs (WBC)  Monitor for signs of aspiration (tachypnea, cough, rales, wheezing, cyanosis, fever)  - Elevate head of bed 30 degrees if patient has tube feeding   - Monitor tube feeding  Outcome: Progressing     Problem: Nutrition  Goal: Nutrition/Hydration status is improving  Description: Monitor and assess patient's nutrition/hydration status for malnutrition (ex- brittle hair, bruises, dry skin, pale skin and conjunctiva, muscle wasting, smooth red tongue, and disorientation)  Collaborate with interdisciplinary team and initiate plan and interventions as ordered  Monitor patient's weight and dietary intake as ordered or per policy  Utilize nutrition screening tool and intervene per policy  Determine patient's food preferences and provide high-protein, high-caloric foods as appropriate  - Assist patient with eating   - Allow adequate time for meals   - Encourage patient to take dietary supplement as ordered  - Collaborate with clinical nutritionist   - Include patient/family/caregiver in decisions related to nutrition    Outcome: Progressing     Problem: SAFETY ADULT  Goal: Patient will remain free of falls  Description: INTERVENTIONS:  - Educate patient/family on patient safety including physical limitations  - Instruct patient to call for assistance with activity   - Consult OT/PT to assist with strengthening/mobility   - Keep Call bell within reach  - Keep bed low and locked with side rails adjusted as appropriate  - Keep care items and personal belongings within reach  - Initiate and maintain comfort rounds  - Make Fall Risk Sign visible to staff  - Offer Toileting every 2 Hours, in advance of need  - Initiate/Maintain bed alarm  - Apply yellow socks and bracelet for high fall risk patients  - Consider moving patient to room near nurses station  Outcome: Progressing  Goal: Maintain or return to baseline ADL function  Description: INTERVENTIONS:  -  Assess patient's ability to carry out ADLs; assess patient's baseline for ADL function and identify physical deficits which impact ability to perform ADLs (bathing, care of mouth/teeth, toileting, grooming, dressing, etc )  - Assess/evaluate cause of self-care deficits   - Assess range of motion  - Assess patient's mobility; develop plan if impaired  - Assess patient's need for assistive devices and provide as appropriate  - Encourage maximum independence but intervene and supervise when necessary  - Involve family in performance of ADLs  - Assess for home care needs following discharge   - Consider OT consult to assist with ADL evaluation and planning for discharge  - Provide patient education as appropriate  Outcome: Progressing  Goal: Maintains/Returns to pre admission functional level  Description: INTERVENTIONS:  - Perform BMAT or MOVE assessment daily    - Set and communicate daily mobility goal to care team and patient/family/caregiver  - Collaborate with rehabilitation services on mobility goals if consulted  - Perform Range of Motion 2 times a day  - Reposition patient every 3 hours    - Dangle patient 3 times a day  - Stand patient 3 times a day  - Ambulate patient 3 times a day  - Out of bed to chair 3 times a day   - Out of bed for meals 3 times a day  - Out of bed for toileting  - Record patient progress and toleration of activity level   Outcome: Progressing     Problem: DISCHARGE PLANNING  Goal: Discharge to home or other facility with appropriate resources  Description: INTERVENTIONS:  - Identify barriers to discharge w/patient and caregiver  - Arrange for needed discharge resources and transportation as appropriate  - Identify discharge learning needs (meds, wound care, etc )  - Arrange for interpretive services to assist at discharge as needed  - Refer to Case Management Department for coordinating discharge planning if the patient needs post-hospital services based on physician/advanced practitioner order or complex needs related to functional status, cognitive ability, or social support system  Outcome: Progressing     Problem: Knowledge Deficit  Goal: Patient/family/caregiver demonstrates understanding of disease process, treatment plan, medications, and discharge instructions  Description: Complete learning assessment and assess knowledge base    Interventions:  - Provide teaching at level of understanding  - Provide teaching via preferred learning methods  Outcome: Progressing     Problem: NEUROSENSORY - ADULT  Goal: Achieves stable or improved neurological status  Description: INTERVENTIONS  - Monitor and report changes in neurological status  - Monitor vital signs such as temperature, blood pressure, glucose, and any other labs ordered   - Initiate measures to prevent increased intracranial pressure  - Monitor for seizure activity and implement precautions if appropriate      Outcome: Progressing  Goal: Remains free of injury related to seizures activity  Description: INTERVENTIONS  - Maintain airway, patient safety  and administer oxygen as ordered  - Monitor patient for seizure activity, document and report duration and description of seizure to physician/advanced practitioner  - If seizure occurs,  ensure patient safety during seizure  - Reorient patient post seizure  - Seizure pads on all 4 side rails  - Instruct patient/family to notify RN of any seizure activity including if an aura is experienced  - Instruct patient/family to call for assistance with activity based on nursing assessment  - Administer anti-seizure medications if ordered    Outcome: Progressing  Goal: Achieves maximal functionality and self care  Description: INTERVENTIONS  - Monitor swallowing and airway patency with patient fatigue and changes in neurological status  - Encourage and assist patient to increase activity and self care     - Encourage visually impaired, hearing impaired and aphasic patients to use assistive/communication devices  Outcome: Progressing     Problem: CARDIOVASCULAR - ADULT  Goal: Maintains optimal cardiac output and hemodynamic stability  Description: INTERVENTIONS:  - Monitor I/O, vital signs and rhythm  - Monitor for S/S and trends of decreased cardiac output  - Administer and titrate ordered vasoactive medications to optimize hemodynamic stability  - Assess quality of pulses, skin color and temperature  - Assess for signs of decreased coronary artery perfusion  - Instruct patient to report change in severity of symptoms  Outcome: Progressing  Goal: Absence of cardiac dysrhythmias or at baseline rhythm  Description: INTERVENTIONS:  - Continuous cardiac monitoring, vital signs, obtain 12 lead EKG if ordered  - Administer antiarrhythmic and heart rate control medications as ordered  - Monitor electrolytes and administer replacement therapy as ordered  Outcome: Progressing     Problem: MUSCULOSKELETAL - ADULT  Goal: Maintain or return mobility to safest level of function  Description: INTERVENTIONS:  - Assess patient's ability to carry out ADLs; assess patient's baseline for ADL function and identify physical deficits which impact ability to perform ADLs (bathing, care of mouth/teeth, toileting, grooming, dressing, etc )  - Assess/evaluate cause of self-care deficits   - Assess range of motion  - Assess patient's mobility  - Assess patient's need for assistive devices and provide as appropriate  - Encourage maximum independence but intervene and supervise when necessary  - Involve family in performance of ADLs  - Assess for home care needs following discharge   - Consider OT consult to assist with ADL evaluation and planning for discharge  - Provide patient education as appropriate  Outcome: Progressing  Goal: Maintain proper alignment of affected body part  Description: INTERVENTIONS:  - Support, maintain and protect limb and body alignment  - Provide patient/ family with appropriate education  Outcome: Progressing Yulia Estevez)  Obstetrics and Gynecology  [facility], [address]  [place] [address]  Phone: [phone]  Fax: [phone]  Follow Up Time: 1-3 Days

## 2022-11-01 NOTE — PATIENT PROFILE OB - NS PRO AD NO ADVANCE DIRECTIVE
Palliative Care Subsequent Visit      Patient Name: Lino Kramer  YOB: 1954  MRN: [id number]     Date of Visit: 11/1/2022   Visit Made By: CAYDEN Witt  Location or unit: Patient's Home    Primary Care Physician:   Dima Alejandro MD  Office Phone #: [phone]  Fax Phone #: [phone]    Establishing Advance Directives and Complex Decision Making  Number of hospitalizations in the last year: n/a  Last hospitalization: July 2022 [facility]    Patient Care Team:   Patient Care Team:  Dima Alejandro MD as PCP - General (Specialist)  MD Geneva Dunham, CNP as Advanced Care at Home: Clinician (Nurse Practitioner - Family)  Aida Griffin LCSW as Advanced Care at Home: MARCIANO (Specialist)    ADVANCE DIRECTIVES:  ?  Power of  Status:  Activated  Code Status:  DNR/DNI/selective  Advanced Directive Forms: discussed, completed & scanned to EMR    History Obtained By: HX Obtained by: Chart Review and Family     Chief Complaint   Patient presents with   • MINISTERION follow-up       HPI  Lino is a 68 year old male seen today for follow up palliative care visit.  PMHx: CVA with R hemiplegia 2013, NPH s/p  shunt, hx of craniotomy with bone flap 2013, seizure disorder, CAD s/p bypass graft, PVD, HTN. Pt was hospitalized 7/20-7/25/2022 [facility] for UTI/HCAP/sepsis; prior was hospitalized for UTI earlier in July '22. He was dc'd home where he lives with his spouse, who is primary CG.  He follows with urology at [facility], also follows with pulmo (Nara) and cardiology (Joy).  He saw ortho 8/22 and had injection to R shoulder.  Present today is pt, spouse Allie; pt's son lives in the home but is not present for encounter. Pt also has one adult daughter that is active/involved/supportive.    Pt stable since last visit. Wife provides all info as pt is aphasic.  She reports that mid October pt had fever x 1 day, then returned to baseline.  She also reports that he had botox injections  to R hand & R leg recently, and that MD restarted 10mg Baclofen qd; she reports med makes pt tired, but that otherwise he is at his baseline.  No further fever, no chest pain, cough, congestion, SOB, v/d. No report of pain.  Appetite is good, no issues with swallowing per wife's report. Sleeps well, naps during the day.  Uses 02 at noc, not during the day.  Moves bowels daily, no issues. Has condom catheter which wife maintains, no issues, states urine clear.  Pt incont of bowel, diapered. Wife monitors VS regularly, stable, sats avg 95% RA during the day. He gets BID-QID duonebs; does aerobika several times daily.  PCP provides wife with rx for po prednisone taper, as he has required this in the past, typically during change of seasons. No other issues or concerns.  GOC is to remain at home; wife adamantly does not want LTC placement. She does not feel she needs any CG help at this time.  Extend life, full code status, continue to see all specialists.     Patient's medications, allergies, past medical, surgical, social and family histories were reviewed and updated as appropriate.    Review of Systems   All other systems reviewed and are negative.  Except as noted in HPI  Current Outpatient Medications   Medication Sig Dispense Refill   • isosorbide mononitrate (MONOKET) 10 MG tablet Take 10 mg by mouth in the morning and 10 mg in the evening.     • oxygen (O2) gas Inhale 2 L/min into the lungs at bedtime.     • oxygen (O2) gas Inhale 2 L/min into the lungs as needed (shortness of breath, oxygen saturation less than 88%).     • albuterol (VENTOLIN) (2.5 MG/3ML) 0.083% nebulizer solution Take 3 mLs by nebulization Every 4 hours as needed for Shortness of Breath or Wheezing. 900 mL 0   • HYDROcodone-acetaminophen (NORCO) 5-325 MG per tablet Take 1 tablet by mouth every 4 hours as needed for Pain. 20 tablet 0   • acetaminophen (TYLENOL) 500 MG tablet Take 1,000 mg by mouth 3 times daily as needed for Pain.     •  aspirin 325 MG tablet Take 325 mg by mouth daily.     • atorvastatin (LIPITOR) 40 MG tablet Take 40 mg by mouth every evening.     • cilostazol (PLETAL) 100 MG tablet Take 100 mg by mouth daily.     • cyanocobalamin 500 MCG tablet Take 500 mcg by mouth daily.     • escitalopram (LEXAPRO) 20 MG tablet Take 20 mg by mouth daily.     • folic acid (FOLATE) 1 MG tablet Take 1 mg by mouth daily.     • gabapentin (NEURONTIN) 400 MG capsule Take 400 mg by mouth in the morning and 400 mg at noon and 400 mg before bedtime.     • ipratropium-albuterol (DUONEB) 0.5-2.5 (3) MG/3ML nebulizer solution Take 3 mLs by nebulization in the morning and 3 mLs before bedtime.     • methenamine (HIPREX) 1 g tablet Take 0.5 g by mouth in the morning and 0.5 g in the evening. Take with meals.     • metoPROLOL tartrate (LOPRESSOR) 25 MG tablet Take 12.5 mg by mouth in the morning and 12.5 mg before bedtime.     • omeprazole (PrilOSEC) 20 MG capsule Take 20 mg by mouth daily.     • risperiDONE (RisperDAL) 0.25 MG tablet Take 0.25 mg by mouth daily.     • Ascorbic Acid (vitamin C) 500 MG tablet Take 500 mg by mouth daily.     • CALCIUM CARBONATE-VITAMIN D PO      • tamsulosin (FLOMAX) 0.4 MG Cap Take 1 capsule by mouth daily after a meal. Do not start before July 15, 2022. 30 capsule 0   • levETIRAcetam (KEPPRA) 750 MG tablet Take 750 mg by mouth 2 times daily.        No current facility-administered medications for this visit.     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   • Essential (primary) hypertension    • High cholesterol    • History of anesthesia complications 8/24/2022   • Sepsis (CMS/[facility]) 7/11/2022   • Urinary incontinence    • Urinary tract  infection      Past Surgical History:   Procedure Laterality Date   • Brain surgery     • Cardiac catherization       Social History     Tobacco Use   Smoking Status Never Smoker   Smokeless Tobacco Never Used     Social History     Substance and Sexual Activity   Alcohol Use Not Currently     Social History     Substance and Sexual Activity   Drug Use Not Currently     No family history on file.    Visit Vitals  /64 (BP Location: LUE - Left upper extremity, Patient Position: Sitting)   Pulse 76   Resp 16   SpO2 95%   Room Air    Pain Assessment  Comfort/Function Pain Score (at REST) 0    Wheelchair    Palliative Performance Scale: 40%    Wheelchair    Good     Labs & Imaging   No results found for this or any previous visit (from the past 1176 hour(s)).    Physical Exam  Constitutional:       Comments: Well groomed male seated in w/c. Alert. NAD   HENT:      Head:      Comments: Healed scar to R side of head from previous craniotomy     Mouth/Throat:      Mouth: Mucous membranes are moist.   Cardiovascular:      Rate and Rhythm: Normal rate and regular rhythm.      Pulses: Normal pulses.      Heart sounds: Normal heart sounds.   Pulmonary:      Effort: Pulmonary effort is normal. No respiratory distress.      Breath sounds: Normal breath sounds.   Abdominal:      General: Bowel sounds are normal. There is no distension.      Palpations: Abdomen is soft.   Genitourinary:     Comments: Catheter draining clear yellow urine  Musculoskeletal:      Cervical back: Neck supple.      Right lower leg: No edema.      Left lower leg: No edema.      Comments: Dense R sided deficit U&L   Skin:     General: Skin is warm and dry.   Neurological:      Comments: Non verbal       Prognosis: fair    High risk of death within one year? unable to determine    Basis for estimate:chart review and clinical assessment    End of Life Discussion completed with the Pt/family: yes    Planned Discharge Disposition:  Home      ASSESSMENT/PLAN  1. Chronic obstructive pulmonary disease, unspecified COPD type (CMS/HCC)  -chronic, stable at present  -cont 02, nebs  -f/u pulmo as planned  - saw Dr Nara Guzman) 10/3/22, no changes  -monitor for increased dyspnea, cough, sputum production  -supportive care, aerobika    2. Hemiplegia and hemiparesis following cerebral infarction affecting right dominant side (CMS/HCC)  3. Late effects of CVA (cerebrovascular accident)  -chronic & stable  -safety, fall precautions  -cont asa, statin, pletal  -24h CG support via wife/family  -activity/PT exercises as tolerated  -massage therapy    4. Seizure disorder (CMS/HCC)  -cont Keppra  -f/u neurology as indicated    5. Peripheral vascular disease, unspecified (CMS/HCC)  -asa, statin, pletal, gabapentin  -f/u cardiology Dr Hamilton as planned    8. Palliative care encounter  9. ACP (advance care planning)    Is the patient capable of making healthcare decisions: No, the patient already has an activated Power of  for Health Care form.   Decision Maker: wife - copy provided & scanned to EMR    Does the patient have an Advance Care Directive document in Epic? Yes, it is current and updated. It is the most recent version, signed and dated by the patient, and witnessed by two non-family/non-caregiver signatures.     After code status discussion, the patient and Power of  for Health Care has decided on: DNR/DNI/selective; POLST completed & in home; sent to be scanned to EMR. Dr Alejandro has copy at his office as well.      Goals of Care:  Patient has one or more life-limiting conditions: Yes    A discussion of the patient's Goals of Care has been completed with patient and Power of  for Health Care regarding the following:  (1) Current Serious Conditions: Moderate to Severe COPD  (2) Prognosis: FAIR     Function: Decline in function and Decline in mobility  (3) Patient-Centered Goals: Aggressive, usual medical care  (4) Plan for Future  Deterioration: Would be ok with returning to hospital for care.    The patient and Power of  for Health Care verbalized understanding of the above discussion.    The following additional care is recommended: Palliative Home Care    Opiate/Pain Management Agreement: No  Date Competed: n/a     Total combined time for today's encounter was 60 minutes, with > 50% of that time spent counseling and coordinating care regarding the above.   Follow up palliative appt scheduled for  12/20/2022; contact info provided & wife urged to call with concerns/questions    Thank you for involving Palliative Medicine at Home.  Please contact the covering provider via [employer] with further questions or concerns.    Geneva Bobby, CNP   Yes

## 2023-02-28 NOTE — ED ADULT NURSE NOTE - NS ED NURSE LEVEL OF CONSCIOUSNESS ORIENTATION
Airway  Urgency: elective    Date/Time: 2/28/2023 7:39 AM  Airway not difficult    General Information and Staff    Patient location during procedure: OR  CRNA/CAA: Eufemia Ku CRNA    Indications and Patient Condition  Indications for airway management: airway protection    Preoxygenated: yes  MILS not maintained throughout  Mask difficulty assessment: 1 - vent by mask    Final Airway Details  Final airway type: endotracheal airway      Successful airway: ETT  Cuffed: yes   Successful intubation technique: video laryngoscopy  Endotracheal tube insertion site: oral  Blade: [place]  Blade size: 3  ETT size (mm): 7.0  Cormack-Lehane Classification: grade I - full view of glottis  Placement verified by: chest auscultation and capnometry   Cuff volume (mL): 6  Measured from: lips  ETT/EBT  to lips (cm): 20  Number of attempts at approach: 1  Assessment: lips, teeth, and gum same as pre-op and atraumatic intubation    Additional Comments  Negative epigastric sounds, Breath sound equal bilaterally with symmetric chest rise and fall         Oriented - self; Oriented - place; Oriented - time

## 2025-07-14 NOTE — PATIENT PROFILE OB - PURPOSEFUL PROACTIVE ROUNDING
History & Physical    Subjective     History of Present Illness:  Patient is a 38 y.o. male status post robotic umbilical hernia repair 05/27/2025 presents for follow up.  He is doing well with no complaints.    Initially referred for umbilical hernia.  He reports bulge in his umbilicus.  Over time it has gotten slightly bigger and causing more discomfort.    No chief complaint on file.      Review of patient's allergies indicates:   Allergen Reactions    Bactrim [sulfamethoxazole-trimethoprim] Itching       Current Medications[1]    Past Medical History:   Diagnosis Date    Primary hypertension 4/29/2025     Past Surgical History:   Procedure Laterality Date    DV5 ROBOTIC REPAIR, HERNIA, VENTRAL N/A 5/27/2025    Procedure: DV5 ROBOTIC REPAIR, VENTRAL HERNIA;  Surgeon: Gallo Mcneil MD;  Location: [facility] OR;  Service: General;  Laterality: N/A;    HERNIA REPAIR  9/8/2020    Mesh    INJECTION OF ANESTHETIC AGENT INTO TISSUE PLANE DEFINED BY TRANSVERSUS ABDOMINIS MUSCLE N/A 5/27/2025    Procedure: BLOCK, TRANSVERSUS ABDOMINIS PLANE;  Surgeon: Gallo Mcneil MD;  Location: [facility] OR;  Service: General;  Laterality: N/A;     No family history on file.  Social History[2]     Review of Systems:  Review of Systems   Constitutional:  Negative for chills, fever and unexpected weight change.   HENT:  Negative for congestion.    Eyes:  Negative for visual disturbance.   Respiratory:  Negative for shortness of breath.    Cardiovascular:  Negative for chest pain.   Gastrointestinal:  Negative for abdominal distention, abdominal pain, constipation, nausea, rectal pain and vomiting.   Genitourinary:  Negative for dysuria.   Musculoskeletal:  Negative for arthralgias.   Skin:  Negative for rash.   Neurological:  Negative for light-headedness.   Hematological:  Negative for adenopathy.          Objective     Vital Signs (Most Recent)              Physical Exam:  Physical Exam  Vitals reviewed.   Constitutional:       Appearance: He is  well-developed.   HENT:      Head: Normocephalic and atraumatic.   Cardiovascular:      Rate and Rhythm: Normal rate.   Pulmonary:      Effort: Pulmonary effort is normal.   Abdominal:      General: There is no distension.      Palpations: Abdomen is soft.      Tenderness: There is no abdominal tenderness.      Hernia: No hernia is present.      Comments: Incisions healing well no signs of infection   Musculoskeletal:      Cervical back: Normal range of motion and neck supple.   Skin:     General: Skin is warm and dry.   Neurological:      Mental Status: He is alert and oriented to person, place, and time.              Assessment and Plan     38-year-old male status post robotic umbilical hernia repair    PLAN:    Well-healed   Okay to resume regular activity  Follow up p.r.n.    The patient location is:  [place]  The chief complaint leading to consultation is:  Umbilical hernia repair    Visit type: audiovisual    Face to Face time with patient: 10  15 minutes of total time spent on the encounter, which includes face to face time and non-face to face time preparing to see the patient (eg, review of tests), Obtaining and/or reviewing separately obtained history, Documenting clinical information in the electronic or other health record, Independently interpreting results (not separately reported) and communicating results to the patient/family/caregiver, or Care coordination (not separately reported).         Each patient to whom he or she provides medical services by telemedicine is:  (1) informed of the relationship between the physician and patient and the respective role of any other health care provider with respect to management of the patient; and (2) notified that he or she may decline to receive medical services by telemedicine and may withdraw from such care at any time.    Notes:                   [1]   Current Outpatient Medications   Medication Sig Dispense Refill    amlodipine-olmesartan (KIERA) 10-20  mg per tablet Take 1 tablet by mouth once daily. 30 tablet 4    cyclobenzaprine (FLEXERIL) 10 MG tablet Take 1 tablet (10 mg total) by mouth 3 (three) times daily as needed for Muscle spasms. 30 tablet 0    ibuprofen (ADVIL,MOTRIN) 800 MG tablet Take 1 tablet (800 mg total) by mouth 3 (three) times daily as needed. 30 tablet 0    rosuvastatin (CRESTOR) 20 MG tablet Take 1 tablet (20 mg total) by mouth once daily. 30 tablet 4     No current facility-administered medications for this visit.   [2]   Social History  Tobacco Use    Smoking status: Never    Smokeless tobacco: Never   Substance Use Topics    Alcohol use: Not Currently    Drug use: Never      Patient